# Patient Record
Sex: FEMALE | Race: WHITE | NOT HISPANIC OR LATINO | Employment: FULL TIME | URBAN - METROPOLITAN AREA
[De-identification: names, ages, dates, MRNs, and addresses within clinical notes are randomized per-mention and may not be internally consistent; named-entity substitution may affect disease eponyms.]

---

## 2017-02-04 ASSESSMENT — MIFFLIN-ST. JEOR: SCORE: 1644.14

## 2017-02-05 ASSESSMENT — MIFFLIN-ST. JEOR: SCORE: 1643.96

## 2017-02-06 ENCOUNTER — SURGERY - HEALTHEAST (OUTPATIENT)
Dept: SURGERY | Facility: CLINIC | Age: 52
End: 2017-02-06

## 2017-02-06 ENCOUNTER — ANESTHESIA - HEALTHEAST (OUTPATIENT)
Dept: SURGERY | Facility: CLINIC | Age: 52
End: 2017-02-06

## 2017-02-06 ASSESSMENT — MIFFLIN-ST. JEOR: SCORE: 1637.16

## 2017-02-07 ASSESSMENT — MIFFLIN-ST. JEOR: SCORE: 1641.41

## 2017-02-09 ENCOUNTER — COMMUNICATION - HEALTHEAST (OUTPATIENT)
Dept: FAMILY MEDICINE | Facility: CLINIC | Age: 52
End: 2017-02-09

## 2017-02-10 ENCOUNTER — COMMUNICATION - HEALTHEAST (OUTPATIENT)
Dept: FAMILY MEDICINE | Facility: CLINIC | Age: 52
End: 2017-02-10

## 2017-02-13 ENCOUNTER — COMMUNICATION - HEALTHEAST (OUTPATIENT)
Dept: FAMILY MEDICINE | Facility: CLINIC | Age: 52
End: 2017-02-13

## 2017-04-18 ENCOUNTER — OFFICE VISIT - HEALTHEAST (OUTPATIENT)
Dept: FAMILY MEDICINE | Facility: CLINIC | Age: 52
End: 2017-04-18

## 2017-04-18 DIAGNOSIS — K85.10 GALLSTONE PANCREATITIS: ICD-10-CM

## 2017-04-18 DIAGNOSIS — F32.A DEPRESSION: ICD-10-CM

## 2017-04-18 ASSESSMENT — MIFFLIN-ST. JEOR: SCORE: 1563.39

## 2017-05-18 ENCOUNTER — COMMUNICATION - HEALTHEAST (OUTPATIENT)
Dept: FAMILY MEDICINE | Facility: CLINIC | Age: 52
End: 2017-05-18

## 2017-05-18 DIAGNOSIS — F33.9 MAJOR DEPRESSIVE DISORDER, RECURRENT EPISODE, UNSPECIFIED: ICD-10-CM

## 2017-06-25 ENCOUNTER — COMMUNICATION - HEALTHEAST (OUTPATIENT)
Dept: FAMILY MEDICINE | Facility: CLINIC | Age: 52
End: 2017-06-25

## 2017-06-25 DIAGNOSIS — E03.9 UNSPECIFIED HYPOTHYROIDISM: ICD-10-CM

## 2017-11-10 ENCOUNTER — COMMUNICATION - HEALTHEAST (OUTPATIENT)
Dept: FAMILY MEDICINE | Facility: CLINIC | Age: 52
End: 2017-11-10

## 2017-11-10 DIAGNOSIS — E03.9 HYPOTHYROIDISM: ICD-10-CM

## 2017-11-22 ENCOUNTER — COMMUNICATION - HEALTHEAST (OUTPATIENT)
Dept: FAMILY MEDICINE | Facility: CLINIC | Age: 52
End: 2017-11-22

## 2017-11-22 DIAGNOSIS — F32.A DEPRESSION: ICD-10-CM

## 2017-12-06 ENCOUNTER — OFFICE VISIT - HEALTHEAST (OUTPATIENT)
Dept: FAMILY MEDICINE | Facility: CLINIC | Age: 52
End: 2017-12-06

## 2017-12-06 DIAGNOSIS — E03.9 HYPOTHYROIDISM: ICD-10-CM

## 2017-12-06 DIAGNOSIS — F32.A DEPRESSION: ICD-10-CM

## 2017-12-06 DIAGNOSIS — L30.9 ECZEMA: ICD-10-CM

## 2017-12-06 DIAGNOSIS — Z12.31 VISIT FOR SCREENING MAMMOGRAM: ICD-10-CM

## 2017-12-06 ASSESSMENT — MIFFLIN-ST. JEOR: SCORE: 1547.07

## 2017-12-08 ENCOUNTER — COMMUNICATION - HEALTHEAST (OUTPATIENT)
Dept: FAMILY MEDICINE | Facility: CLINIC | Age: 52
End: 2017-12-08

## 2017-12-08 DIAGNOSIS — E03.9 HYPOTHYROIDISM: ICD-10-CM

## 2018-01-19 ENCOUNTER — HOSPITAL ENCOUNTER (OUTPATIENT)
Dept: MAMMOGRAPHY | Facility: CLINIC | Age: 53
Discharge: HOME | End: 2018-01-19
Attending: FAMILY MEDICINE

## 2018-01-19 DIAGNOSIS — Z12.31 VISIT FOR SCREENING MAMMOGRAM: ICD-10-CM

## 2018-04-30 ENCOUNTER — RECORDS - HEALTHEAST (OUTPATIENT)
Dept: ADMINISTRATIVE | Facility: OTHER | Age: 53
End: 2018-04-30

## 2018-05-30 ENCOUNTER — COMMUNICATION - HEALTHEAST (OUTPATIENT)
Dept: FAMILY MEDICINE | Facility: CLINIC | Age: 53
End: 2018-05-30

## 2018-05-30 DIAGNOSIS — F32.A DEPRESSION: ICD-10-CM

## 2018-06-28 ENCOUNTER — COMMUNICATION - HEALTHEAST (OUTPATIENT)
Dept: FAMILY MEDICINE | Facility: CLINIC | Age: 53
End: 2018-06-28

## 2018-06-28 DIAGNOSIS — F32.A DEPRESSION: ICD-10-CM

## 2018-07-13 ENCOUNTER — OFFICE VISIT - HEALTHEAST (OUTPATIENT)
Dept: FAMILY MEDICINE | Facility: CLINIC | Age: 53
End: 2018-07-13

## 2018-07-13 DIAGNOSIS — Z12.11 SCREEN FOR COLON CANCER: ICD-10-CM

## 2018-07-13 DIAGNOSIS — E03.9 HYPOTHYROIDISM: ICD-10-CM

## 2018-07-13 DIAGNOSIS — F32.A DEPRESSION: ICD-10-CM

## 2018-10-06 ENCOUNTER — OFFICE VISIT - HEALTHEAST (OUTPATIENT)
Dept: FAMILY MEDICINE | Facility: CLINIC | Age: 53
End: 2018-10-06

## 2018-10-06 DIAGNOSIS — R05.9 COUGH: ICD-10-CM

## 2018-10-06 DIAGNOSIS — R07.0 THROAT PAIN: ICD-10-CM

## 2018-10-06 LAB — DEPRECATED S PYO AG THROAT QL EIA: NORMAL

## 2018-10-07 LAB — GROUP A STREP BY PCR: NORMAL

## 2018-10-09 ENCOUNTER — OFFICE VISIT - HEALTHEAST (OUTPATIENT)
Dept: FAMILY MEDICINE | Facility: CLINIC | Age: 53
End: 2018-10-09

## 2018-10-09 DIAGNOSIS — J18.9 PNEUMONIA OF RIGHT LOWER LOBE DUE TO INFECTIOUS ORGANISM: ICD-10-CM

## 2018-10-09 DIAGNOSIS — J18.1 LOBAR PNEUMONIA, UNSPECIFIED ORGANISM (H): ICD-10-CM

## 2018-10-12 ENCOUNTER — OFFICE VISIT - HEALTHEAST (OUTPATIENT)
Dept: FAMILY MEDICINE | Facility: CLINIC | Age: 53
End: 2018-10-12

## 2018-10-12 DIAGNOSIS — J18.9 PNEUMONIA: ICD-10-CM

## 2018-10-12 ASSESSMENT — MIFFLIN-ST. JEOR: SCORE: 1614.2

## 2018-10-22 ENCOUNTER — COMMUNICATION - HEALTHEAST (OUTPATIENT)
Dept: SCHEDULING | Facility: CLINIC | Age: 53
End: 2018-10-22

## 2018-10-22 ENCOUNTER — COMMUNICATION - HEALTHEAST (OUTPATIENT)
Dept: FAMILY MEDICINE | Facility: CLINIC | Age: 53
End: 2018-10-22

## 2018-10-30 ENCOUNTER — OFFICE VISIT - HEALTHEAST (OUTPATIENT)
Dept: FAMILY MEDICINE | Facility: CLINIC | Age: 53
End: 2018-10-30

## 2018-10-30 DIAGNOSIS — J02.9 SORE THROAT: ICD-10-CM

## 2018-10-30 DIAGNOSIS — J18.9 PNEUMONIA: ICD-10-CM

## 2018-10-31 ENCOUNTER — OFFICE VISIT - HEALTHEAST (OUTPATIENT)
Dept: OTOLARYNGOLOGY | Facility: CLINIC | Age: 53
End: 2018-10-31

## 2018-10-31 DIAGNOSIS — R07.0 THROAT PAIN: ICD-10-CM

## 2018-11-01 ENCOUNTER — OFFICE VISIT - HEALTHEAST (OUTPATIENT)
Dept: AUDIOLOGY | Facility: CLINIC | Age: 53
End: 2018-11-01

## 2018-11-01 DIAGNOSIS — Z71.1 PERSON WITH FEARED COMPLAINT IN WHOM NO DIAGNOSIS WAS MADE: ICD-10-CM

## 2018-11-20 ENCOUNTER — COMMUNICATION - HEALTHEAST (OUTPATIENT)
Dept: FAMILY MEDICINE | Facility: CLINIC | Age: 53
End: 2018-11-20

## 2018-11-20 DIAGNOSIS — R05.9 COUGH: ICD-10-CM

## 2018-11-22 ENCOUNTER — COMMUNICATION - HEALTHEAST (OUTPATIENT)
Dept: FAMILY MEDICINE | Facility: CLINIC | Age: 53
End: 2018-11-22

## 2018-11-22 DIAGNOSIS — R05.9 COUGH: ICD-10-CM

## 2018-11-23 ENCOUNTER — COMMUNICATION - HEALTHEAST (OUTPATIENT)
Dept: FAMILY MEDICINE | Facility: CLINIC | Age: 53
End: 2018-11-23

## 2018-11-23 DIAGNOSIS — R05.9 COUGH: ICD-10-CM

## 2018-11-28 ENCOUNTER — COMMUNICATION - HEALTHEAST (OUTPATIENT)
Dept: FAMILY MEDICINE | Facility: CLINIC | Age: 53
End: 2018-11-28

## 2018-11-28 DIAGNOSIS — E03.9 HYPOTHYROIDISM: ICD-10-CM

## 2019-01-14 ENCOUNTER — OFFICE VISIT - HEALTHEAST (OUTPATIENT)
Dept: FAMILY MEDICINE | Facility: CLINIC | Age: 54
End: 2019-01-14

## 2019-01-14 DIAGNOSIS — E03.9 HYPOTHYROIDISM, UNSPECIFIED TYPE: ICD-10-CM

## 2019-01-14 DIAGNOSIS — F32.9 MAJOR DEPRESSIVE DISORDER WITH SINGLE EPISODE, REMISSION STATUS UNSPECIFIED: ICD-10-CM

## 2019-01-14 DIAGNOSIS — Z23 NEED FOR TDAP VACCINATION: ICD-10-CM

## 2019-01-15 LAB
T4 FREE SERPL-MCNC: 1 NG/DL (ref 0.7–1.8)
TSH SERPL DL<=0.005 MIU/L-ACNC: 1.49 UIU/ML (ref 0.3–5)

## 2019-05-18 ENCOUNTER — COMMUNICATION - HEALTHEAST (OUTPATIENT)
Dept: FAMILY MEDICINE | Facility: CLINIC | Age: 54
End: 2019-05-18

## 2019-05-18 DIAGNOSIS — E03.9 HYPOTHYROIDISM: ICD-10-CM

## 2019-07-11 ENCOUNTER — COMMUNICATION - HEALTHEAST (OUTPATIENT)
Dept: FAMILY MEDICINE | Facility: CLINIC | Age: 54
End: 2019-07-11

## 2019-07-11 DIAGNOSIS — F32.9 MAJOR DEPRESSIVE DISORDER WITH SINGLE EPISODE, REMISSION STATUS UNSPECIFIED: ICD-10-CM

## 2019-07-16 ENCOUNTER — OFFICE VISIT - HEALTHEAST (OUTPATIENT)
Dept: FAMILY MEDICINE | Facility: CLINIC | Age: 54
End: 2019-07-16

## 2019-07-16 DIAGNOSIS — E03.9 HYPOTHYROIDISM, UNSPECIFIED TYPE: ICD-10-CM

## 2019-07-16 DIAGNOSIS — F33.41 RECURRENT MAJOR DEPRESSIVE DISORDER, IN PARTIAL REMISSION (H): ICD-10-CM

## 2019-07-16 DIAGNOSIS — L98.9 SKIN LESION: ICD-10-CM

## 2019-07-16 DIAGNOSIS — Z12.31 VISIT FOR SCREENING MAMMOGRAM: ICD-10-CM

## 2019-07-17 LAB
T4 FREE SERPL-MCNC: 0.9 NG/DL (ref 0.7–1.8)
TSH SERPL DL<=0.005 MIU/L-ACNC: 1.62 UIU/ML (ref 0.3–5)

## 2019-09-06 ENCOUNTER — HOSPITAL ENCOUNTER (OUTPATIENT)
Dept: MAMMOGRAPHY | Facility: CLINIC | Age: 54
Discharge: HOME | End: 2019-09-06
Attending: FAMILY MEDICINE

## 2019-09-06 DIAGNOSIS — Z12.31 VISIT FOR SCREENING MAMMOGRAM: ICD-10-CM

## 2020-01-17 ENCOUNTER — OFFICE VISIT - HEALTHEAST (OUTPATIENT)
Dept: FAMILY MEDICINE | Facility: CLINIC | Age: 55
End: 2020-01-17

## 2020-01-17 DIAGNOSIS — R11.10 VOMITING AND DIARRHEA: ICD-10-CM

## 2020-01-17 DIAGNOSIS — E03.9 HYPOTHYROIDISM, UNSPECIFIED TYPE: ICD-10-CM

## 2020-01-17 DIAGNOSIS — Z00.00 ROUTINE GENERAL MEDICAL EXAMINATION AT A HEALTH CARE FACILITY: ICD-10-CM

## 2020-01-17 DIAGNOSIS — F33.41 RECURRENT MAJOR DEPRESSIVE DISORDER, IN PARTIAL REMISSION (H): ICD-10-CM

## 2020-01-17 DIAGNOSIS — R19.7 VOMITING AND DIARRHEA: ICD-10-CM

## 2020-01-17 DIAGNOSIS — K59.00 CONSTIPATION, UNSPECIFIED CONSTIPATION TYPE: ICD-10-CM

## 2020-01-17 LAB
ALBUMIN UR-MCNC: NEGATIVE MG/DL
APPEARANCE UR: CLEAR
BILIRUB UR QL STRIP: NEGATIVE
CHOLEST SERPL-MCNC: 158 MG/DL
COLOR UR AUTO: YELLOW
FASTING STATUS PATIENT QL REPORTED: YES
GLUCOSE UR STRIP-MCNC: NEGATIVE MG/DL
HDLC SERPL-MCNC: 57 MG/DL
HGB BLD-MCNC: 14.1 G/DL (ref 12–16)
HGB UR QL STRIP: NEGATIVE
KETONES UR STRIP-MCNC: NEGATIVE MG/DL
LDLC SERPL CALC-MCNC: 82 MG/DL
LEUKOCYTE ESTERASE UR QL STRIP: NEGATIVE
NITRATE UR QL: NEGATIVE
PH UR STRIP: 7 [PH] (ref 5–8)
SP GR UR STRIP: 1.02 (ref 1–1.03)
T4 FREE SERPL-MCNC: 0.9 NG/DL (ref 0.7–1.8)
TRIGL SERPL-MCNC: 94 MG/DL
TSH SERPL DL<=0.005 MIU/L-ACNC: 1.61 UIU/ML (ref 0.3–5)
UROBILINOGEN UR STRIP-ACNC: NORMAL

## 2020-01-17 ASSESSMENT — ANXIETY QUESTIONNAIRES
2. NOT BEING ABLE TO STOP OR CONTROL WORRYING: NEARLY EVERY DAY
1. FEELING NERVOUS, ANXIOUS, OR ON EDGE: SEVERAL DAYS
GAD7 TOTAL SCORE: 12
IF YOU CHECKED OFF ANY PROBLEMS ON THIS QUESTIONNAIRE, HOW DIFFICULT HAVE THESE PROBLEMS MADE IT FOR YOU TO DO YOUR WORK, TAKE CARE OF THINGS AT HOME, OR GET ALONG WITH OTHER PEOPLE: SOMEWHAT DIFFICULT
6. BECOMING EASILY ANNOYED OR IRRITABLE: MORE THAN HALF THE DAYS
7. FEELING AFRAID AS IF SOMETHING AWFUL MIGHT HAPPEN: MORE THAN HALF THE DAYS
5. BEING SO RESTLESS THAT IT IS HARD TO SIT STILL: SEVERAL DAYS
4. TROUBLE RELAXING: SEVERAL DAYS
3. WORRYING TOO MUCH ABOUT DIFFERENT THINGS: MORE THAN HALF THE DAYS

## 2020-01-17 ASSESSMENT — PATIENT HEALTH QUESTIONNAIRE - PHQ9: SUM OF ALL RESPONSES TO PHQ QUESTIONS 1-9: 6

## 2020-01-17 ASSESSMENT — MIFFLIN-ST. JEOR: SCORE: 1658.43

## 2020-01-20 LAB
HPV SOURCE: NORMAL
HUMAN PAPILLOMA VIRUS 16 DNA: NEGATIVE
HUMAN PAPILLOMA VIRUS 18 DNA: NEGATIVE
HUMAN PAPILLOMA VIRUS FINAL DIAGNOSIS: NORMAL
HUMAN PAPILLOMA VIRUS OTHER HR: NEGATIVE
SPECIMEN DESCRIPTION: NORMAL

## 2020-03-13 ENCOUNTER — COMMUNICATION - HEALTHEAST (OUTPATIENT)
Dept: FAMILY MEDICINE | Facility: CLINIC | Age: 55
End: 2020-03-13

## 2020-03-13 DIAGNOSIS — F33.41 RECURRENT MAJOR DEPRESSIVE DISORDER, IN PARTIAL REMISSION (H): ICD-10-CM

## 2020-03-20 ENCOUNTER — COMMUNICATION - HEALTHEAST (OUTPATIENT)
Dept: FAMILY MEDICINE | Facility: CLINIC | Age: 55
End: 2020-03-20

## 2020-03-20 DIAGNOSIS — F33.41 RECURRENT MAJOR DEPRESSIVE DISORDER, IN PARTIAL REMISSION (H): ICD-10-CM

## 2020-03-23 ENCOUNTER — COMMUNICATION - HEALTHEAST (OUTPATIENT)
Dept: FAMILY MEDICINE | Facility: CLINIC | Age: 55
End: 2020-03-23

## 2020-04-23 ENCOUNTER — COMMUNICATION - HEALTHEAST (OUTPATIENT)
Dept: FAMILY MEDICINE | Facility: CLINIC | Age: 55
End: 2020-04-23

## 2020-04-23 DIAGNOSIS — F33.41 RECURRENT MAJOR DEPRESSIVE DISORDER, IN PARTIAL REMISSION (H): ICD-10-CM

## 2020-06-16 ENCOUNTER — OFFICE VISIT - HEALTHEAST (OUTPATIENT)
Dept: FAMILY MEDICINE | Facility: CLINIC | Age: 55
End: 2020-06-16

## 2020-06-16 DIAGNOSIS — E03.9 HYPOTHYROIDISM, UNSPECIFIED TYPE: ICD-10-CM

## 2020-06-16 DIAGNOSIS — F33.41 RECURRENT MAJOR DEPRESSIVE DISORDER, IN PARTIAL REMISSION (H): ICD-10-CM

## 2020-06-16 ASSESSMENT — ANXIETY QUESTIONNAIRES
4. TROUBLE RELAXING: NOT AT ALL
6. BECOMING EASILY ANNOYED OR IRRITABLE: MORE THAN HALF THE DAYS
1. FEELING NERVOUS, ANXIOUS, OR ON EDGE: MORE THAN HALF THE DAYS
IF YOU CHECKED OFF ANY PROBLEMS ON THIS QUESTIONNAIRE, HOW DIFFICULT HAVE THESE PROBLEMS MADE IT FOR YOU TO DO YOUR WORK, TAKE CARE OF THINGS AT HOME, OR GET ALONG WITH OTHER PEOPLE: NOT DIFFICULT AT ALL
2. NOT BEING ABLE TO STOP OR CONTROL WORRYING: SEVERAL DAYS
3. WORRYING TOO MUCH ABOUT DIFFERENT THINGS: NEARLY EVERY DAY
7. FEELING AFRAID AS IF SOMETHING AWFUL MIGHT HAPPEN: MORE THAN HALF THE DAYS
GAD7 TOTAL SCORE: 10
5. BEING SO RESTLESS THAT IT IS HARD TO SIT STILL: NOT AT ALL

## 2020-06-16 ASSESSMENT — PATIENT HEALTH QUESTIONNAIRE - PHQ9: SUM OF ALL RESPONSES TO PHQ QUESTIONS 1-9: 8

## 2020-09-24 ENCOUNTER — COMMUNICATION - HEALTHEAST (OUTPATIENT)
Dept: FAMILY MEDICINE | Facility: CLINIC | Age: 55
End: 2020-09-24

## 2020-09-24 DIAGNOSIS — E03.9 HYPOTHYROIDISM, UNSPECIFIED TYPE: ICD-10-CM

## 2021-01-04 ENCOUNTER — COMMUNICATION - HEALTHEAST (OUTPATIENT)
Dept: FAMILY MEDICINE | Facility: CLINIC | Age: 56
End: 2021-01-04

## 2021-01-04 DIAGNOSIS — E03.9 HYPOTHYROIDISM, UNSPECIFIED TYPE: ICD-10-CM

## 2021-01-04 DIAGNOSIS — F33.41 RECURRENT MAJOR DEPRESSIVE DISORDER, IN PARTIAL REMISSION (H): ICD-10-CM

## 2021-02-02 ENCOUNTER — OFFICE VISIT - HEALTHEAST (OUTPATIENT)
Dept: FAMILY MEDICINE | Facility: CLINIC | Age: 56
End: 2021-02-02

## 2021-02-02 DIAGNOSIS — F33.41 RECURRENT MAJOR DEPRESSIVE DISORDER, IN PARTIAL REMISSION (H): ICD-10-CM

## 2021-02-02 DIAGNOSIS — Z12.11 SCREEN FOR COLON CANCER: ICD-10-CM

## 2021-02-02 DIAGNOSIS — E03.9 HYPOTHYROIDISM, UNSPECIFIED TYPE: ICD-10-CM

## 2021-02-02 ASSESSMENT — ANXIETY QUESTIONNAIRES
IF YOU CHECKED OFF ANY PROBLEMS ON THIS QUESTIONNAIRE, HOW DIFFICULT HAVE THESE PROBLEMS MADE IT FOR YOU TO DO YOUR WORK, TAKE CARE OF THINGS AT HOME, OR GET ALONG WITH OTHER PEOPLE: NOT DIFFICULT AT ALL
5. BEING SO RESTLESS THAT IT IS HARD TO SIT STILL: NOT AT ALL
2. NOT BEING ABLE TO STOP OR CONTROL WORRYING: SEVERAL DAYS
4. TROUBLE RELAXING: NOT AT ALL
3. WORRYING TOO MUCH ABOUT DIFFERENT THINGS: MORE THAN HALF THE DAYS
5. BEING SO RESTLESS THAT IT IS HARD TO SIT STILL: NOT AT ALL
1. FEELING NERVOUS, ANXIOUS, OR ON EDGE: NOT AT ALL
2. NOT BEING ABLE TO STOP OR CONTROL WORRYING: SEVERAL DAYS
1. FEELING NERVOUS, ANXIOUS, OR ON EDGE: NEARLY EVERY DAY
IF YOU CHECKED OFF ANY PROBLEMS ON THIS QUESTIONNAIRE, HOW DIFFICULT HAVE THESE PROBLEMS MADE IT FOR YOU TO DO YOUR WORK, TAKE CARE OF THINGS AT HOME, OR GET ALONG WITH OTHER PEOPLE: NOT DIFFICULT AT ALL
4. TROUBLE RELAXING: MORE THAN HALF THE DAYS
GAD7 TOTAL SCORE: 9
3. WORRYING TOO MUCH ABOUT DIFFERENT THINGS: SEVERAL DAYS
7. FEELING AFRAID AS IF SOMETHING AWFUL MIGHT HAPPEN: SEVERAL DAYS
7. FEELING AFRAID AS IF SOMETHING AWFUL MIGHT HAPPEN: MORE THAN HALF THE DAYS
6. BECOMING EASILY ANNOYED OR IRRITABLE: SEVERAL DAYS
GAD7 TOTAL SCORE: 5
6. BECOMING EASILY ANNOYED OR IRRITABLE: NOT AT ALL

## 2021-02-02 ASSESSMENT — PATIENT HEALTH QUESTIONNAIRE - PHQ9: SUM OF ALL RESPONSES TO PHQ QUESTIONS 1-9: 7

## 2021-02-04 ENCOUNTER — AMBULATORY - HEALTHEAST (OUTPATIENT)
Dept: LAB | Facility: CLINIC | Age: 56
End: 2021-02-04

## 2021-02-04 DIAGNOSIS — E03.9 HYPOTHYROIDISM, UNSPECIFIED TYPE: ICD-10-CM

## 2021-02-04 LAB
T4 FREE SERPL-MCNC: 0.9 NG/DL (ref 0.7–1.8)
TSH SERPL DL<=0.005 MIU/L-ACNC: 2.59 UIU/ML (ref 0.3–5)

## 2021-05-27 ASSESSMENT — PATIENT HEALTH QUESTIONNAIRE - PHQ9
SUM OF ALL RESPONSES TO PHQ QUESTIONS 1-9: 8
SUM OF ALL RESPONSES TO PHQ QUESTIONS 1-9: 7
SUM OF ALL RESPONSES TO PHQ QUESTIONS 1-9: 6

## 2021-05-28 ASSESSMENT — ANXIETY QUESTIONNAIRES
GAD7 TOTAL SCORE: 12
GAD7 TOTAL SCORE: 10
GAD7 TOTAL SCORE: 5

## 2021-05-28 NOTE — TELEPHONE ENCOUNTER
Refill Approved    Rx renewed per Medication Renewal Policy. Medication was last renewed on 11/30/18  #90 R-0.    Last OV 1/14/19    Sonya Rodriguez, [facility] Triage/Med Refill 5/19/2019     Requested Prescriptions   Pending Prescriptions Disp Refills     levothyroxine (SYNTHROID, LEVOTHROID) 75 MCG tablet [Pharmacy Med Name: LEVOTHYROXINE 0.075MG (75MCG) TABS] 90 tablet 0     Sig: TAKE 1 TABLET BY MOUTH EVERY DAY AT 6AM       Thyroid Hormones Protocol Passed - 5/18/2019  3:13 AM        Passed - Provider visit in past 12 months or next 3 months     Last office visit with prescriber/PCP: 1/14/2019 Beronica Serrato MD OR same dept: 1/14/2019 Beronica Serrato MD OR same specialty: 1/14/2019 Beronica Serrato MD  Last physical: Visit date not found Last MTM visit: Visit date not found   Next visit within 3 mo: Visit date not found  Next physical within 3 mo: Visit date not found  Prescriber OR PCP: Beronica Serrato MD  Last diagnosis associated with med order: 1. Hypothyroidism  - levothyroxine (SYNTHROID, LEVOTHROID) 75 MCG tablet [Pharmacy Med Name: LEVOTHYROXINE 0.075MG (75MCG) TABS]; TAKE 1 TABLET BY MOUTH EVERY DAY AT 6AM  Dispense: 90 tablet; Refill: 0    If protocol passes may refill for 12 months if within 3 months of last provider visit (or a total of 15 months).             Passed - TSH on file in past 12 months for patient age 12 & older     TSH   Date Value Ref Range Status   01/14/2019 1.49 0.30 - 5.00 uIU/mL Final

## 2021-05-30 ENCOUNTER — HEALTH MAINTENANCE LETTER (OUTPATIENT)
Age: 56
End: 2021-05-30

## 2021-05-30 VITALS — BODY MASS INDEX: 33.34 KG/M2 | WEIGHT: 220 LBS | HEIGHT: 68 IN

## 2021-05-30 VITALS — WEIGHT: 202.8 LBS | HEIGHT: 68 IN | BODY MASS INDEX: 30.74 KG/M2

## 2021-05-30 NOTE — TELEPHONE ENCOUNTER
RN cannot approve Refill Request    RN can NOT refill this medication Protocol failed and NO refill given.       Bisi Amador, [facility] Triage/Med Refill 7/11/2019    Requested Prescriptions   Pending Prescriptions Disp Refills     venlafaxine (EFFEXOR-XR) 150 MG 24 hr capsule [Pharmacy Med Name: VENLAFAXINE ER 150MG CAP] 30 capsule 0     Sig: TAKE 1 CAPSULE BY MOUTH ONCE DAILY       Venlafaxine/Desvenlafaxine Refill Protocol Failed - 7/11/2019  7:48 PM        Failed - LFT or AST or ALT in last year     Albumin   Date Value Ref Range Status   04/18/2017 3.7 3.5 - 5.0 g/dL Final     Bilirubin, Total   Date Value Ref Range Status   04/18/2017 0.4 0.0 - 1.0 mg/dL Final     Bilirubin, Direct   Date Value Ref Range Status   02/07/2017 0.2 <=0.5 mg/dL Final     Alkaline Phosphatase   Date Value Ref Range Status   04/18/2017 64 45 - 120 U/L Final     AST   Date Value Ref Range Status   04/18/2017 31 0 - 40 U/L Final     ALT   Date Value Ref Range Status   04/18/2017 38 0 - 45 U/L Final     Protein, Total   Date Value Ref Range Status   04/18/2017 6.9 6.0 - 8.0 g/dL Final                Failed - Fasting lipid cascade in last year     Cholesterol   Date Value Ref Range Status   02/06/2017 91 <=199 mg/dL Final     Triglycerides   Date Value Ref Range Status   02/06/2017 42 <=149 mg/dL Final     HDL Cholesterol   Date Value Ref Range Status   02/06/2017 42 (L) >=50 mg/dL Final     LDL Calculated   Date Value Ref Range Status   02/06/2017 41 <=129 mg/dL Final             Passed - PCP or prescribing provider visit in last year     Last office visit with prescriber/PCP: 1/14/2019 Beronica Serrato MD OR same dept: 1/14/2019 Beronica Serrato MD OR same specialty: 1/14/2019 Beronica Serrato MD  Last physical: Visit date not found Last MTM visit: Visit date not found   Next visit within 3 mo: Visit date not found  Next physical within 3 mo: Visit date not found  Prescriber OR PCP: Beronica Serrato MD  Last  diagnosis associated with med order: 1. Major depressive disorder with single episode, remission status unspecified  - venlafaxine (EFFEXOR-XR) 150 MG 24 hr capsule [Pharmacy Med Name: VENLAFAXINE ER 150MG CAP]; TAKE 1 CAPSULE BY MOUTH ONCE DAILY  Dispense: 30 capsule; Refill: 0    If protocol passes may refill for 12 months if within 3 months of last provider visit (or a total of 15 months).             Passed - Blood Pressure in last year     BP Readings from Last 1 Encounters:   01/14/19 99/64

## 2021-05-30 NOTE — PROGRESS NOTES
PROGRESS NOTE   7/16/2019    SUBJECTIVE:  Bonny Brooks is a 53 y.o. female  who presents for   Chief Complaint   Patient presents with     Medication Management     Med check     Depression     Depression has gotten better, got new job     Patient comes in today for med check as well as a couple of other issues.  She has a history of depression.  She currently is using Effexor 150 mg a day for her depression.  She feels like overall things are doing much better.  She got a new job and is created a new life for her.  She feels much much better more hopeful and really now admits that she did not realize how much her job was bringing her down.  She is not suicidal or homicidal.  Please see PHQ 9 and MIKO which are both completed in their entirety today.  She started her new job just a few days ago but is so impressed by the flexibility that they have given her and the fact that they told her it was okay for her to leave early today to come to the doctor appointment that had already been set.  She is already realizing that they are giving her a lot more opportunities than her previous job did and she is very thankful for that.  The more time that goes by the more she realizes truly that her job was probably a big part of her depression.  She also has a history of hypothyroidism and needs her thyroid levels rechecked.  She has no symptoms that would be an indication that her thyroid levels are off however we will recheck them because it has been 6 months since we have rechecked in the last time.  She was wondering about her health maintenance issues and we went over the fact that she last had a mammogram in January 2018.  She is therefore due for mammogram.  We will order a mammogram today but also will give her the phone number to call herself to schedule that.  She is due for colonoscopy in 2021.  Her other concerns are that she has a lump on the top of the crown of her head that is been there for about the last 2  weeks or so.  She calls an annoying.  It is not really necessarily painful but it is annoying for her.  Whenever she runs her fingers through her hair she notices that its there.  It does not seem to be growing but it also does not seem like is getting any smaller.  She also has a lesion in her left groin area that is been there for about the last 3 to 4 weeks or so.  Again this is annoying but not painful.  It seems like this is continuing to be the same size rather than growing.  She has not been running any fevers or showing any other signs of infection.    Patient Active Problem List   Diagnosis     Hypothyroidism, unspecified type     Recurrent major depressive disorder, in partial remission (H)       Current Outpatient Medications   Medication Sig Dispense Refill     ibuprofen (ADVIL,MOTRIN) 200 MG tablet Take 200 mg by mouth every 6 (six) hours as needed for pain.       L.ACID/L.CASEI/B.BIF/B.MARKIE/FOS (PROBIOTIC BLEND ORAL) Take 1 tablet by mouth daily.       levothyroxine (SYNTHROID, LEVOTHROID) 75 MCG tablet TAKE ONE TABLET BY MOUTH EVERY DAY AT 6 AM 90 tablet 1     venlafaxine (EFFEXOR-XR) 150 MG 24 hr capsule Take 1 capsule (150 mg total) by mouth daily. 90 capsule 1     phenylephrine/DM/acetaminop/GG (TYLENOL COLD HEAD CONGESTION ORAL) Take by mouth.       No current facility-administered medications for this visit.        Allergies   Allergen Reactions     Penicillins Hives       Past Medical History:   Diagnosis Date     Alcohol abuse     sober now     Gallstone pancreatitis 2017    had gallbladder removed     Genital herpes      Major depressive disorder, recurrent episode, unspecified      Normal delivery     x2     Unspecified hypothyroidism      Unspecified spontaneous  without mention of complication     x2       Past Surgical History:   Procedure Laterality Date     LAPAROSCOPIC CHOLECYSTECTOMY N/A 2017    Procedure: CHOLECYSTECTOMY, LAPAROSCOPIC;  Surgeon: Sandeep Ariza MD;   Location: [facility] OR;  Service:      REDUCTION MAMMAPLASTY  2006       Social History     Tobacco Use   Smoking Status Never Smoker   Smokeless Tobacco Never Used       OBJECTIVE:     /80   Pulse 89   Wt (!) 224 lb (101.6 kg)   SpO2 98%   BMI 34.06 kg/m      Physical Exam:  GENERAL APPEARANCE: A&A, NAD, well hydrated, well nourished  SKIN:  Normal skin turgor  On the top of the head near the crown of the head she has a very small flesh-colored lump that I cannot visualize but can feel when she directs me as to where the lump is.  There is no redness there is no warmth to the touch there is no other evidence for infection.  NECK: Supple, full ROM, no significant lymphadenopathy or thyromegaly  CV: RRR, no M/G/R   LUNGS: CTAB  On exam of her left groin area basically outside of the labia majora area there is a 3 mm size raised somewhat hard purple bump that appears to be an ingrown hair.  There is no redness there is no warmth to the touch there is no fluctuance around the area.  There is no evidence for infection at all.  There is a dark spot in the middle which appears to be a ingrown hair.  The entire area is nontender to palpation.  EXTREMITY: no swelling noted.  Full range of motion of all 4 extremities.   NEURO: no gross deficits   PSYCHIATRIC;  Mood appropriate, memory intact  A&O x3, thought processes congruent, non-tangential. No hallucinations/delusions. Insight/judgment: intact. Denies suicidal/homicidal ideations.      LABS:     Recent Results (from the past 240 hour(s))   Thyroid Stimulating Hormone (TSH)   Result Value Ref Range    TSH 1.62 0.30 - 5.00 uIU/mL   T4, Free   Result Value Ref Range    Free T4 0.9 0.7 - 1.8 ng/dL       ASSESSMENT/PLAN:     Recurrent major depressive disorder, in partial remission (H) [F33.41]      1. Recurrent major depressive disorder, in partial remission (H)  - venlafaxine (EFFEXOR-XR) 150 MG 24 hr capsule; Take 1 capsule (150 mg total) by mouth daily.   Dispense: 90 capsule; Refill: 1    2. Hypothyroidism, unspecified type  - Thyroid Stimulating Hormone (TSH)  - T4, Free  - levothyroxine (SYNTHROID, LEVOTHROID) 75 MCG tablet; TAKE ONE TABLET BY MOUTH EVERY DAY AT 6 AM  Dispense: 90 tablet; Refill: 1    3. Skin lesion    4. Visit for screening mammogram  - Mammo Screening Bilateral; Future    Patient overall seems to be doing okay.  In terms of her depression she continues on Effexor 150 mg a day.  She feels like overall things are doing much better than they were and she credits that mostly the fact that she got a new job.  She is tolerating the Effexor without any problems and would very much like to continue on this medication.  She is not suicidal or homicidal.  Please see PHQ 9 and MIKO which are both completed in their entirety today.  Refill of her Effexor was given today.  3-month supply was given with a refill which should take her through the next 6 months.  She has any changes in her symptoms or if things become worse she certainly should let us know.  It sounds like getting a new job is really given her new perspective on life and she is improved in terms of her depression and I am hoping that we will do nothing but improve again.  She does have a history of hypothyroidism we will go ahead and check a thyroid level today including a TSH as well as a free T4.  She does need a refill of her thyroid medication today.  I did send a refill today to the pharmacy but asked her not to pick it up until after the thyroid levels have returned.  She overall is feeling well and does not have any indication that her thyroid levels are off.  In terms of the skin lesions that she has I could not see anything on the top of her head to explain the lump that she is feeling or the annoying pain that she is feeling.  I looked very hard to find any kind of a lump and the only thing I can feel is a very slight bump on the very crown of her head which is nontender to palpation  not red and is not visualized at all.  She will continue to monitor this is reassurance was given today that I do not see anything that makes me worry.  She has additional changes in her symptoms or has more problems will certainly let me know.  In terms of the lesion in her left groin area I think this is an ingrown hair.  It is nontender to palpation it is not red is not fluctuant and showing no sign of infection and I reassured her that I do not see any evidence for abnormality right now.  It is not bothering her tremendously and therefore I would suggest that she continue to monitor it for now.  She is fine with doing that for now and was happy to know that it was not anything else that she needed to be concerned about.  Again if this changes will certainly let me know.  Mammogram was ordered today.  They should contact her to get mammogram scheduled she was also given the phone number to call to schedule a mammogram.  All of her questions were answered today.  We will see her back in about 6 months for follow-up on these medical problems as well as her annual exam. Total time spent with patient was at least 25 minutes,  of which greater than fifty percent was spent in counselling and coordination of care of the above medical problems.   Beronica Serrato MD

## 2021-05-30 NOTE — TELEPHONE ENCOUNTER
Prescription sent to pharmacy electronically. Please call and let patient know this should be available for  at the pharmacy.    Patient has appointment for follow up in the next month already set up.

## 2021-05-31 VITALS — WEIGHT: 199.2 LBS | HEIGHT: 68 IN | BODY MASS INDEX: 30.19 KG/M2

## 2021-06-01 VITALS — BODY MASS INDEX: 32.69 KG/M2 | WEIGHT: 215 LBS

## 2021-06-02 VITALS — BODY MASS INDEX: 32.23 KG/M2 | WEIGHT: 212 LBS

## 2021-06-02 VITALS — WEIGHT: 214.31 LBS | BODY MASS INDEX: 32.59 KG/M2

## 2021-06-02 VITALS — HEIGHT: 68 IN | BODY MASS INDEX: 32.43 KG/M2 | WEIGHT: 214 LBS

## 2021-06-02 VITALS — BODY MASS INDEX: 32.39 KG/M2 | WEIGHT: 213 LBS

## 2021-06-02 VITALS — WEIGHT: 216 LBS | BODY MASS INDEX: 32.84 KG/M2

## 2021-06-03 VITALS — BODY MASS INDEX: 34.06 KG/M2 | WEIGHT: 224 LBS

## 2021-06-04 VITALS
HEIGHT: 69 IN | OXYGEN SATURATION: 95 % | SYSTOLIC BLOOD PRESSURE: 102 MMHG | BODY MASS INDEX: 32.88 KG/M2 | HEART RATE: 80 BPM | WEIGHT: 222 LBS | DIASTOLIC BLOOD PRESSURE: 72 MMHG

## 2021-06-05 NOTE — PROGRESS NOTES
Assessment:      Healthy female exam.      Plan:       All questions answered.  Await pap smear results.      ASSESSMENT: 54 y.o. female physical exam and pap smear.    PLAN:     Routine general medical examination at a health care facility [Z00.00]    1. Routine general medical examination at a health care facility  - Gynecologic Cytology (PAP Smear)  - Hemoglobin  - Urinalysis Macroscopic  - Lipid Cascade  - HPV High Risk DNA Cervical    2. Hypothyroidism, unspecified type  - Thyroid Stimulating Hormone (TSH)  - T4, Free  - levothyroxine (SYNTHROID, LEVOTHROID) 75 MCG tablet; TAKE ONE TABLET BY MOUTH EVERY DAY AT 6 AM  Dispense: 90 tablet; Refill: 3    3. Recurrent major depressive disorder, in partial remission (H)    4. Vomiting and diarrhea  - Ambulatory referral to Gastroenterology    5. Constipation, unspecified constipation type  - Ambulatory referral to Gastroenterology      Patient is a 54 y.o. female who is overall doing well.  She continues on Effexor which is working well for depression.  She does not need a refill of that today but when she does she certainly will let me know.  She is not suicidal or homicidal.  Please see PHQ 9 and MIKO which are both complete in their entirety today.  We will continue to refill her Effexor for another 6 months and then she is due to follow-up at that time.  She also has a history of hypothyroidism we will recheck thyroid levels today.  She is not having any symptoms that be suggestive of her thyroid level being off.  I did refill her thyroid medication today.  She will wait until she hears back on the results of the thyroid testing that was done today before she goes to pick it up but I suspect she will continue on the same dose.  She has had a lot of problems with vomiting diarrhea and constipation.  She has had problems with vomiting and pasty stools since the last pregnancy about 15 years ago whenever she is due for her cycle.  It comes about a week before her  cycle but since July now she has had more constipation than diarrhea that has been accompanied by vomiting.  She did the vomiting is such that she has an episode of vomiting and then she is Apsley fine afterwards.  She has no blood in her stool at all either.  It definitely seems to be related to her hormones and when she is due for her menstrual cycle.  She is starting to get a little bit more irregularity to her menstrual cycles and still gets the vomiting when she should get her cycle even if she does not get her cycle.  I think she would be best to be evaluated further.  I did place referral to gastroenterologist and someone from their office should contact her regarding getting an appointment set up.  She declines flu vaccination today.  She declined shingles vaccination today.  She declines HIV testing and hepatitis C testing.  She notes that she is due for mammogram in September 2020.  She had her last colonoscopy in 2011.  All of her questions and concerns were addressed today.  If she has additional problems or concerns should let me know.    Will contact her with the results of the labs when available.  Additional 15 minutes spent with patient discussing nausea and vomiting symptoms as well as constipation and especially in terms of how it relates to her cycles as well as her diagnosis of major depression and Effexor usage..  Beronica Serrato M.D.       Subjective:      Bonny Brooks is a 54 y.o. female who presents for an annual exam. The patient is sexually active. The patient participates in regular exercise: no. The patient reports that there is not domestic violence in her life.  She does have a history of hypothyroidism and does need her thyroid levels drawn today.  We will check a TSH as well as a free T4 to follow-up on her hypothyroidism.  She continues on Synthroid which seems to be working well.  We will give her a refill of that today.  She also has a history of depression and is doing  well on her Effexor.  Please see PHQ 9 and MIKO which are both complete in their entirety today.  She is not suicidal or homicidal.  She has been on Effexor for quite some time and tolerates it well.  She had a disturbing discussion with her  this morning just prior to coming so she is feeling like a little bit of a failure currently but normally feels fine.  She does not need a refill the medication today when she does she certainly will let me know.  She is concerned about bowel movements.  She notes that she is having very inconsistent bowel movements.  Especially 1 week prior to her cycle she will have vomiting and pasty stools.  This is been the case for the last 15 years since she gave birth to her last child.  In July of this past year she noted that she had the vomiting in the pasty stools but she also had lots of issues with constipation as well.  This seems to have persisted and since then she has had more constipation with vomiting rather than diarrhea with vomiting.  She has had some irregularity to her cycles as well and she is wondering if perhaps that is what is contributing to the difference in her bowel habits.  Unfortunately she is feeling really ill at the times when she should have her menstrual cycle even if she does not get her menstrual cycle.  She notes that she vomits and then an hour later she is absolutely fine so she is fairly certain that this is not an infection but rather something to do with her hormone levels.  She had a normal colonoscopy in 2011.  She has not noticed any blood in her stools at all.  Patient is due for shingles vaccination as well as a flu vaccination both of which she declines.  She is also due for an HIV test and a hepatitis C testing both of which she declines.  Her mammogram is up-to-date having had one in September 2019.    Healthy Habits:   Regular Exercise: No  Sunscreen Use: Yes  Healthy Diet: No  Dental Visits Regularly: Yes  Seat Belt: Yes  Sexually  active: Yes  Self Breast Exam Monthly:No  Hemoccults: N/A  Flex Sig: N/A  Colonoscopy: N/A  Lipid Profile: N/A  Glucose Screen: N/A  Prevention of Osteoporosis: Yes  Last Dexa: No  Guns at Home:  No      Immunization History   Administered Date(s) Administered     Influenza, inj, historic,unspecified 2004     Influenza,seasonal quad, PF, =/> 6months 2014     Influenza,seasonal, Inj IIV3 10/23/2006, 2007, 10/23/2008, 2009, 2010, 10/19/2011, 10/02/2012, 2013     Tdap 2007, 2019     Immunization status: up to date and documented, Refuses Immunization, patient is due for flu vaccination as well as a shingles vaccination which she declines both of those..    No exam data present    Gynecologic History  2019  Contraception: none  Last Pap: Unknown. Results were: normal  Last mammogram: 2019. Results were: normal      OB History    Para Term  AB Living   4 2 2 0 2 2   SAB TAB Ectopic Multiple Live Births   2 0 0 0 2      # Outcome Date GA Lbr Tamir/2nd Weight Sex Delivery Anes PTL Lv   4 SAB            3 SAB            2 Term            1 Term                Current Outpatient Medications   Medication Sig Dispense Refill     levothyroxine (SYNTHROID, LEVOTHROID) 75 MCG tablet TAKE ONE TABLET BY MOUTH EVERY DAY AT 6 AM 90 tablet 3     venlafaxine (EFFEXOR-XR) 150 MG 24 hr capsule Take 1 capsule (150 mg total) by mouth daily. 90 capsule 1     ibuprofen (ADVIL,MOTRIN) 200 MG tablet Take 200 mg by mouth every 6 (six) hours as needed for pain.       No current facility-administered medications for this visit.      Past Medical History:   Diagnosis Date     Alcohol abuse     sober now     Gallstone pancreatitis 2017    had gallbladder removed     Genital herpes      Major depressive disorder, recurrent episode, unspecified      Normal delivery     x2     Unspecified hypothyroidism      Unspecified spontaneous  without mention of complication     x2      Past Surgical History:   Procedure Laterality Date     LAPAROSCOPIC CHOLECYSTECTOMY N/A 2/6/2017    Procedure: CHOLECYSTECTOMY, LAPAROSCOPIC;  Surgeon: Sandeep Ariza MD;  Location: [facility];  Service:      REDUCTION MAMMAPLASTY  2006     Penicillins  Family History   Problem Relation Age of Onset     Alcohol abuse Father      Osteoarthritis Father      Anemia Maternal Grandfather      Thyroid disease Maternal Grandfather      Anemia Maternal Aunt      Macular degeneration Paternal Grandmother      Hypertension Paternal Grandmother      Pancreatic cancer Paternal Uncle      Osteoarthritis Mother      Thyroid disease Maternal Grandmother      Social History     Socioeconomic History     Marital status:      Spouse name: Logan     Number of children: 2     Years of education: Not on file     Highest education level: Not on file   Occupational History     Occupation: /   Social Needs     Financial resource strain: Not on file     Food insecurity:     Worry: Not on file     Inability: Not on file     Transportation needs:     Medical: Not on file     Non-medical: Not on file   Tobacco Use     Smoking status: Never Smoker     Smokeless tobacco: Never Used   Substance and Sexual Activity     Alcohol use: No     Comment: previous history of heavier alcohol use, sober now     Drug use: No     Sexual activity: Yes     Partners: Male     Birth control/protection: Surgical     Comment: vasectomy   Lifestyle     Physical activity:     Days per week: Not on file     Minutes per session: Not on file     Stress: Not on file   Relationships     Social connections:     Talks on phone: Not on file     Gets together: Not on file     Attends Christian service: Not on file     Active member of club or organization: Not on file     Attends meetings of clubs or organizations: Not on file     Relationship status: Not on file     Intimate partner violence:     Fear of current or ex  "partner: Not on file     Emotionally abused: Not on file     Physically abused: Not on file     Forced sexual activity: Not on file   Other Topics Concern     Not on file   Social History Narrative     Not on file            Little interest or pleasure in doing things: Not at all  Feeling down, depressed, or hopeless: Several days  Trouble falling or staying asleep, or sleeping too much: Not at all  Feeling tired or having little energy: Several days  Poor appetite or overeating: Several days  Feeling bad about yourself - or that you are a failure or have let yourself or your family down: Nearly every day  Trouble concentrating on things, such as reading the newspaper or watching television: Not at all  Moving or speaking so slowly that other people could have noticed. Or the opposite - being so fidgety or restless that you have been moving around a lot more than usual: Not at all  Thoughts that you would be better off dead, or of hurting yourself in some way: Not at all  PHQ-9 Total Score: 6  If you checked off any problems, how difficult have these problems made it for you to do your work, take care of things at home, or get along with other people?: Somewhat difficult     Total MIKO 7 Score       1/17/2020             MIKO 7 Total Score:  12            Objective:         Vitals:    01/17/20 0802   BP: 102/72   Pulse: 80   SpO2: 95%   Weight: 222 lb (100.7 kg)   Height: 5' 8.5\" (1.74 m)     Body mass index is 33.26 kg/m .       REVIEW OF SYSTEMS:   Denies fever, chills, visual changes, fatigue, myalgias, nasal congestion, rhinorrhea, ear pain or discharge, sore throat, swollen glands, breast mass, nipple discharge, breast changes, abdominal pain, nausea, vomiting, diarrhea, constipation, cough, shortness of breath, chest pain, weight change, change in bowel habits, melena, rectal bleeding, dysuria, frequency, urgency, hematuria, polyuria, polydipsia, polyphagia, joint pain or swelling or erythema, edema, rash, " "weakness, paresthesias, vaginal discharge or bleeding or mood changes.  Remainder of review of systems was negative.      PHYSICAL EXAM:  On exam, patient is a WD, WN 54 y.o. female in NAD.  /72   Pulse 80   Ht 5' 8.5\" (1.74 m)   Wt 222 lb (100.7 kg)   LMP 11/05/2019 (Approximate)   SpO2 95%   Breastfeeding No   BMI 33.26 kg/m    Head normocephalic, atraumatic.  Eyes PERRL, ears TM s clear bilaterally.  Throat without significant erythemia or exudate.  Neck was supple, full range of motion. No significant lymphadenopathy or thyromegaly was appreciated.  Lungs clear to auscultation  Heart regular rate and rhythm.  Breast exam was done. No masses were appreciated. Axilla were clear bilaterally. No nipple discharge was appreciated. Self breast exam was reviewed and taught today.  Abdomen was soft, nontender, nondistended. No masses or organomegaly were palpated. Positive bowel sounds were appreciated.  Extremities with full range of motion of all 4 extremities were noted.  Deep tendon reflexes were equal and symmetrical. Motor and sensation were intact to both the upper and lower extremities.  Cranial nerves 2 through 12 were grossly intact.  EOM were intact.  Pelvic exam was done.  External genitalia appeared normal. Speculum was introduced and the Pap smear obtained. Bimanual exam revealed uterus to be normal size and adenexa without palpable masses.     Recent Results (from the past 240 hour(s))   Hemoglobin   Result Value Ref Range    Hemoglobin 14.1 12.0 - 16.0 g/dL   Lipid Cascade   Result Value Ref Range    Cholesterol 158 <=199 mg/dL    Triglycerides 94 <=149 mg/dL    HDL Cholesterol 57 >=50 mg/dL    LDL Calculated 82 <=129 mg/dL    Patient Fasting > 8hrs? Yes    Thyroid Stimulating Hormone (TSH)   Result Value Ref Range    TSH 1.61 0.30 - 5.00 uIU/mL   T4, Free   Result Value Ref Range    Free T4 0.9 0.7 - 1.8 ng/dL   Urinalysis Macroscopic   Result Value Ref Range    Color, UA Yellow Colorless, " Yellow, Straw, Light Yellow    Clarity, UA Clear Clear    Glucose, UA Negative Negative    Bilirubin, UA Negative Negative    Ketones, UA Negative Negative    Specific Gravity, UA 1.020 1.005 - 1.030    Blood, UA Negative Negative    pH, UA 7.0 5.0 - 8.0    Protein, UA Negative Negative mg/dL    Urobilinogen, UA 0.2 E.U./dL 0.2 E.U./dL, 1.0 E.U./dL    Nitrite, UA Negative Negative    Leukocytes, UA Negative Negative   Gynecologic Cytology (PAP Smear)   Result Value Ref Range    Case Report       Gynecologic Cytology Report                       Case: [id number]                                   Authorizing Provider:  Beronica Serrato MD    Collected:           01/17/2020 0927              Ordering Location:     [facility]       Received:            01/17/2020 0927                                     Family Medicine/OB                                                           First Screen:          Reyna Bran CT                                                                               (ASCP)                                                                       Specimen:    SUREPATH PAP, SCREENING, Endocervical/cervical                                             Interpretation  Negative for squamous intraepithelial lesion or malignancy.      Negative for squamous intraepithelial lesion or malignancy    Result Flag Normal Normal    Specimen Adequacy       Satisfactory for evaluation, endocervical/transformation zone component present    HPV Reflex? Yes regardless of result     HIGH RISK No     LMP/Menopause Date 11/5/2019     Abnormal Bleeding No     Pt Status NA     Birth Control/Hormones None     Previous Normal/Date Unknown     Prev Abn Date/Dx None     Cervical Appearance Normal    HPV High Risk DNA Cervical   Result Value Ref Range    HPV Source SurePath     HPV16 DNA Negative NEG    HPV18 DNA Negative NEG    Other HR HPV Negative NEG    Final Diagnosis SEE NOTES     Specimen Description  Cervical Cells

## 2021-06-07 NOTE — TELEPHONE ENCOUNTER
Please call her and let her know that we did refill her medication for 90 days however she needs to be seen in follow up prior to any further refills.

## 2021-06-07 NOTE — TELEPHONE ENCOUNTER
RN cannot approve Refill Request    RN can NOT refill this medication PCP messaged that patient is overdue for Labs. Last office visit: 7/16/2019 Beronica Serrato MD Last Physical: 1/17/2020 Last MTM visit: Visit date not found Last visit same specialty: 7/16/2019 Beronica Serrato MD.  Next visit within 3 mo: Visit date not found  Next physical within 3 mo: Visit date not found      Ana Ruffin, [facility] Triage/Med Refill 4/23/2020    Requested Prescriptions   Pending Prescriptions Disp Refills     venlafaxine (EFFEXOR-XR) 150 MG 24 hr capsule [Pharmacy Med Name: Venlafaxine HCl  MG Oral Capsule Extended Release 24 Hour] 90 capsule 0     Sig: Take 1 capsule by mouth once daily       Venlafaxine/Desvenlafaxine Refill Protocol Failed - 4/23/2020  4:37 PM        Failed - LFT or AST or ALT in last year     Albumin   Date Value Ref Range Status   04/18/2017 3.7 3.5 - 5.0 g/dL Final     Bilirubin, Total   Date Value Ref Range Status   04/18/2017 0.4 0.0 - 1.0 mg/dL Final     Bilirubin, Direct   Date Value Ref Range Status   02/07/2017 0.2 <=0.5 mg/dL Final     Alkaline Phosphatase   Date Value Ref Range Status   04/18/2017 64 45 - 120 U/L Final     AST   Date Value Ref Range Status   04/18/2017 31 0 - 40 U/L Final     ALT   Date Value Ref Range Status   04/18/2017 38 0 - 45 U/L Final     Protein, Total   Date Value Ref Range Status   04/18/2017 6.9 6.0 - 8.0 g/dL Final                Passed - Fasting lipid cascade in last year     Cholesterol   Date Value Ref Range Status   01/17/2020 158 <=199 mg/dL Final     Triglycerides   Date Value Ref Range Status   01/17/2020 94 <=149 mg/dL Final     HDL Cholesterol   Date Value Ref Range Status   01/17/2020 57 >=50 mg/dL Final     LDL Calculated   Date Value Ref Range Status   01/17/2020 82 <=129 mg/dL Final     Patient Fasting > 8hrs?   Date Value Ref Range Status   01/17/2020 Yes  Final             Passed - PCP or prescribing provider visit in last year      Last office visit with prescriber/PCP: 7/16/2019 Beronica Serrato MD OR same dept: 7/16/2019 Beronica Serrato MD OR same specialty: 7/16/2019 Beronica Serrato MD  Last physical: 1/17/2020 Last MTM visit: Visit date not found   Next visit within 3 mo: Visit date not found  Next physical within 3 mo: Visit date not found  Prescriber OR PCP: Beronica Serrato MD  Last diagnosis associated with med order: 1. Recurrent major depressive disorder, in partial remission (H)  - venlafaxine (EFFEXOR-XR) 150 MG 24 hr capsule [Pharmacy Med Name: Venlafaxine HCl  MG Oral Capsule Extended Release 24 Hour]; Take 1 capsule by mouth once daily  Dispense: 90 capsule; Refill: 0    If protocol passes may refill for 12 months if within 3 months of last provider visit (or a total of 15 months).             Passed - Blood Pressure in last year     BP Readings from Last 1 Encounters:   01/17/20 102/72

## 2021-06-07 NOTE — TELEPHONE ENCOUNTER
Fax came in on patient form [facility] pharmacy for venlafaxine er 150 mg cap. Fax in [employer] inbox

## 2021-06-07 NOTE — TELEPHONE ENCOUNTER
Prescription resent.   Prescription sent to pharmacy electronically. Please call and let patient know this should be available for  at the pharmacy.

## 2021-06-07 NOTE — TELEPHONE ENCOUNTER
Medication Question or Clarification  Who is calling: Patient  What medication are you calling about (include dose and sig)?:   venlafaxine (EFFEXOR-XR) 150 MG 24 hr capsule  90 capsule  0  3/16/2020      Sig: TAKE 1 CAPSULE BY MOUTH ONCE DAILY     Sent to pharmacy as: venlafaxine  mg capsule,extended release 24 hr (EFFEXOR-XR)     E-Prescribing Status: Receipt confirmed by pharmacy (3/16/2020 11:31 AM CDT)         Who prescribed the medication?:   Beronica Serrato MD   What is your question/concern?:   Please re-send script as Pharmacy states they did not receive above medication script.  Thank you.  Requested Pharmacy: [id number]  Okay to leave a detailed message?: Yes

## 2021-06-08 NOTE — ANESTHESIA POSTPROCEDURE EVALUATION
Patient: Bonny Brooks  CHOLECYSTECTOMY, LAPAROSCOPIC  Anesthesia type: general    Patient location: PACU  Last vitals:   Vitals:    02/06/17 1843   BP: 124/88   Pulse: 90   Resp: 22   Temp: 36.7  C (98.1  F)   SpO2: 92%     Post vital signs: stable  Level of consciousness: awake, oriented and responds to simple questions  Post-anesthesia pain: pain controlled  Post-anesthesia nausea and vomiting: no  Pulmonary: unassisted, return to baseline, face mask  Cardiovascular: stable and blood pressure at baseline  Hydration: adequate  Anesthetic events: no    QCDR Measures:  ASA# 11 - Nichole-op Cardiac Arrest: ASA11B - Patient did NOT experience unanticipated cardiac arrest  ASA# 12 - Nichole-op Mortality Rate: ASA12B - Patient did NOT die  ASA# 13 - PACU Re-Intubation Rate: ASA13B - Patient did NOT require a new airway mgmt  ASA# 10 - Composite Anes Safety: ASA10A - No serious adverse event  ASA# 38 - New Corneal Injury: ASA38A - No new exposure keratitis or corneal abrasion in PACU    Additional Notes:

## 2021-06-08 NOTE — PROGRESS NOTES
"Bonny Brooks is a 54 y.o. female who is being evaluated via a billable telephone visit.      The patient has been notified of following:     \"This telephone visit will be conducted via a call between you and your physician/provider. We have found that certain health care needs can be provided without the need for a physical exam.  This service lets us provide the care you need with a short phone conversation.  If a prescription is necessary we can send it directly to your pharmacy.  If lab work is needed we can place an order for that and you can then stop by our lab to have the test done at a later time.    Telephone visits are billed at different rates depending on your insurance coverage. During this emergency period, for some insurers they may be billed the same as an in-person visit.  Please reach out to your insurance provider with any questions.    If during the course of the call the physician/provider feels a telephone visit is not appropriate, you will not be charged for this service.\"    Patient has given verbal consent to a Telephone visit? Yes    What phone number would you like to be contacted at? [phone]    Patient would like to receive their AVS by AVS Preference: [place].      Chief Complaint   Patient presents with     Medication Management     Allergies   Allergen Reactions     Penicillins Hives     Past Medical History:   Diagnosis Date     Alcohol abuse     sober now     Gallstone pancreatitis 2017    had gallbladder removed     Genital herpes      Major depressive disorder, recurrent episode, unspecified      Normal delivery     x2     Unspecified hypothyroidism      Unspecified spontaneous  without mention of complication     x2     Past Surgical History:   Procedure Laterality Date     LAPAROSCOPIC CHOLECYSTECTOMY N/A 2017    Procedure: CHOLECYSTECTOMY, LAPAROSCOPIC;  Surgeon: Sandeep Ariza MD;  Location: [facility];  Service:      REDUCTION MAMMAPLASTY   "     Family History   Problem Relation Age of Onset     Alcohol abuse Father      Osteoarthritis Father      Anemia Maternal Grandfather      Thyroid disease Maternal Grandfather      Anemia Maternal Aunt      Macular degeneration Paternal Grandmother      Hypertension Paternal Grandmother      Pancreatic cancer Paternal Uncle      Osteoarthritis Mother      Thyroid disease Maternal Grandmother        Current Outpatient Medications:      ibuprofen (ADVIL,MOTRIN) 200 MG tablet, Take 200 mg by mouth every 6 (six) hours as needed for pain., Disp: , Rfl:      levothyroxine (SYNTHROID, LEVOTHROID) 75 MCG tablet, TAKE ONE TABLET BY MOUTH EVERY DAY AT 6 AM, Disp: 90 tablet, Rfl: 3     multivitamin therapeutic tablet, Take 1 tablet by mouth daily., Disp: , Rfl:      venlafaxine (EFFEXOR-XR) 150 MG 24 hr capsule, Take 1 capsule (150 mg total) by mouth daily., Disp: 90 capsule, Rfl: 1       Little interest or pleasure in doing things: Several days  Feeling down, depressed, or hopeless: Several days  Trouble falling or staying asleep, or sleeping too much: More than half the days  Feeling tired or having little energy: Not at all  Poor appetite or overeating: Nearly every day  Feeling bad about yourself - or that you are a failure or have let yourself or your family down: Several days  Trouble concentrating on things, such as reading the newspaper or watching television: Not at all  Moving or speaking so slowly that other people could have noticed. Or the opposite - being so fidgety or restless that you have been moving around a lot more than usual: Not at all  Thoughts that you would be better off dead, or of hurting yourself in some way: Not at all  PHQ-9 Total Score: 8  If you checked off any problems, how difficult have these problems made it for you to do your work, take care of things at home, or get along with other people?: Not difficult at all      Total MIKO 7 Score       6/16/2020             MIKO 7 Total Score:  10         Additional provider notes:   Patient is seen today via telephone visit rather than office visit due to the COVID 19 outbreak pandemic.  This is done for the protection of patient from potential exposure to this virus by coming to the clinic.  Patient is being evaluated today for chronic medical problems.  She continues on Effexor for her depression.  Overall things seem to be going well.  Please see PHQ 9 and MIKO which are both completed in their entirety today.  She feels like overall things are doing great.  She is been going to work every day and has not had to change much of her schedule due to the pandemic and she is quite thankful for that.  She has seen the change in routine affect lots of her friends emotionally and so she is quite thankful that she has been able to go to work on a daily basis.  She continues on Effexor 150 mg a day.  She does not have any side effects to that medication.  She is not suicidal or homicidal.  She feels like things are very stable.  Her PHQ 9 and MIKO are essentially unchanged from what they were the last time we did them.  She would very much like to continue on this medication.  She also has a history of hypothyroidism and continues on thyroid medication on a daily basis.  We last did a thyroid level in January which was normal and she has not changed her dose of medication for quite some time and therefore will not repeat that today she does note that she has some constipation for which she has been taking some supplements and that seems to be helping.  She is quite pleased with how everything is going and would very much like to continue on the medication.    Assessment/Plan:  1. Recurrent major depressive disorder, in partial remission (H)  - venlafaxine (EFFEXOR-XR) 150 MG 24 hr capsule; Take 1 capsule (150 mg total) by mouth daily.  Dispense: 90 capsule; Refill: 1    2. Hypothyroidism, unspecified type     Patient is overall doing well.  She continues on Effexor for  her depression which seems to be working well.  She does not have any side effects to the medication would very much like to continue on that.  Please see PHQ 9 and MIKO which are both completed their entirety today.  She is not suicidal or homicidal.  Refill of Effexor was sent to the pharmacy today.  3-month supply was given with 1 refill which should take her through the next 6 months.  She is due to come back in in January for a physical exam.  If she needs a refill of her medication prior to her physical she certainly should let me know.  We will plan to check her thyroid levels at that time as well as she seems to be doing fine is having no symptoms currently that her thyroid levels are off.  If that changes or if she needs additional refills of her thyroid medication she certainly should let me know as well.  She is quite pleased with how everything is going and if things change will let me know but otherwise we will see her in January for a physical exam.  All of her questions were answered today.    Phone call duration:  10 minutes    Beronica Serrato MD

## 2021-06-08 NOTE — ANESTHESIA CARE TRANSFER NOTE
Last vitals:   Vitals:    02/06/17 1650   BP: 133/80   Pulse: (!) 119   Resp: 20   Temp: (!) 38.1  C (100.6  F)   SpO2: 90%     Patient's level of consciousness is awake  Spontaneous respirations: yes  Maintains airway independently: yes  Dentition unchanged: yes  Oropharynx: oropharynx clear of all foreign objects    QCDR Measures:  ASA# 20 - Surgical Safety Checklist: ASA20A - Safety Checks Done  PQRS# 430 - Adult PONV Prevention: 4558F - Pt received => 2 anti-emetic agents (different classes) preop & intraop  ASA# 8 - Peds PONV Prevention: 4558F - Pt received => 2 anti-emetic agents (different classes) preop & intraop  PQRS# 424 - Nichole-op Temp Management: 4559F - At least one body temp DOCUMENTED => 35.5C or 95.9F within required timeframe  PQRS# 426 - PACU Transfer Protocol: - Transfer of care checklist used  ASA# 14 - Acute Post-op Pain: ASA14B - Patient did NOT experience pain >= 7 out of 10   The patient is Spont Breathing, responding to commands,  Reflexes  Intact.  VSS      I completed my SBAR handoff to the receiving nurse per policy and procedure.

## 2021-06-08 NOTE — ANESTHESIA PREPROCEDURE EVALUATION
Anesthesia Evaluation      Patient summary reviewed   No history of anesthetic complications     Airway   Mallampati: II  Neck ROM: full   Pulmonary - normal exam                          Cardiovascular - negative ROS and normal exam  Rhythm: regular  Rate: normal,         Neuro/Psych - negative ROS     Endo/Other    (+) hypothyroidism, obesity,      GI/Hepatic/Renal - negative ROS           Dental - normal exam                        Anesthesia Plan  Planned anesthetic: general endotracheal  Propofol induction  ASA 2   Induction: intravenous   Anesthetic plan and risks discussed with: patient    Post-op plan: routine recovery

## 2021-06-10 NOTE — PROGRESS NOTES
PROGRESS NOTE   4/18/2017    SUBJECTIVE:  Bonny Brooks is a 51 y.o. female  who presents for   Chief Complaint   Patient presents with     Depression     recheck     Patient comes in today for follow-up of her depression.  In the last few months she has been hospitalized for gallstone pancreatitis and had a cholecystectomy.  The last time we rechecked her electrolytes and liver functions her liver functions were still elevated and therefore I would like to repeat those today.  She notes that overall she is feeling much better.  Pretty much immediately when she got the gallbladder out she started to feel better.  She is having some difficulty with the gastrointestinal issues that go along with not having a gallbladder but overall she feels like things are doing well.  She notes that she is lost about 18 pounds since her gallbladder incident due to the fact that she can eat like she did before she had surgery.  She also is here to follow-up on her depression which seems to be doing well.  She continues on Effexor which is working well.  She is not having any side effects to this medication.  Please see PHQ 9 and MIKO which were both completed in their entirety today.  She is not suicidal or homicidal.  She does note that her depression was bad for a little while during the last 6 months or so but it seems to be much better again now.  She would very much like to continue on the Effexor and will go ahead and give her a refill of that today.  She is also on thyroid medications and those seem to working fine as well.  She just had her thyroid level drawn about 6 months ago and it was normal and she has been on the same dose of thyroid medication for quite some time so will not repeat that today.  We will wait and repeat that at her next visit.    Patient Active Problem List   Diagnosis     Gallstone pancreatitis     Fever, unspecified fever cause     Cholelithiasis     Hypothyroidism, unspecified type       Current  "Outpatient Prescriptions   Medication Sig Dispense Refill     levothyroxine (SYNTHROID, LEVOTHROID) 75 MCG tablet Take 1 tablet (75 mcg total) by mouth daily. 90 tablet 1     UNABLE TO FIND Take 1 packet by mouth daily. Med Name: Plexus slim-   1 packet mixed in water daily       venlafaxine (EFFEXOR-XR) 150 MG 24 hr capsule Take 1 capsule (150 mg total) by mouth daily. 90 capsule 1     L.ACID/L.CASEI/B.BIF/B.MARKIE/FOS (PROBIOTIC BLEND ORAL) Take 1 tablet by mouth daily.       multivitamin therapeutic (THERAGRAN) tablet Take 1 tablet by mouth daily.       No current facility-administered medications for this visit.        Allergies   Allergen Reactions     Penicillins Hives       Past Medical History:   Diagnosis Date     Alcohol abuse     sober now     Gallstone pancreatitis 2017    had gallbladder removed     Major depressive disorder, recurrent episode, unspecified      Normal delivery     x2     Unspecified hypothyroidism      Unspecified spontaneous  without mention of complication     x2       Past Surgical History:   Procedure Laterality Date     LAPAROSCOPIC CHOLECYSTECTOMY N/A 2017    Procedure: CHOLECYSTECTOMY, LAPAROSCOPIC;  Surgeon: Sandeep Ariza MD;  Location: [facility];  Service:      REDUCTION MAMMAPLASTY         History   Smoking Status     Former Smoker     Packs/day: 0.10     Years: 4.00     Quit date: 1995   Smokeless Tobacco     Never Used       OBJECTIVE:     /70 (Patient Site: Right Arm, Patient Position: Sitting, Cuff Size: Adult Regular)  Pulse 68 Comment: regular  Temp 98.1  F (36.7  C) (Oral)   Resp 14 Comment: regular  Ht 5' 8\" (1.727 m)  Wt 202 lb 12.8 oz (92 kg)  BMI 30.84 kg/m2    Physical Exam:  GENERAL APPEARANCE: A&A, NAD, well hydrated, well nourished  SKIN:  Normal skin turgor, no lesions/rashes   CV: RRR, no M/G/R   LUNGS: CTAB  ABDOMEN: S&NT, no masses or organomegaly, positive bowel sounds   EXTREMITY: no edema   NEURO: no gross " deficits   PSYCHIATRIC;  Mood appropriate, memory intact    LABS:     Recent Results (from the past 240 hour(s))   Comprehensive Metabolic Panel   Result Value Ref Range    Sodium 139 136 - 145 mmol/L    Potassium 4.1 3.5 - 5.0 mmol/L    Chloride 103 98 - 107 mmol/L    CO2 29 22 - 31 mmol/L    Anion Gap, Calculation 7 5 - 18 mmol/L    Glucose 93 70 - 125 mg/dL    BUN 11 8 - 22 mg/dL    Creatinine 0.73 0.60 - 1.10 mg/dL    GFR MDRD Af Amer >60 >60 mL/min/1.73m2    GFR MDRD Non Af Amer >60 >60 mL/min/1.73m2    Bilirubin, Total 0.4 0.0 - 1.0 mg/dL    Calcium 9.5 8.5 - 10.5 mg/dL    Protein, Total 6.9 6.0 - 8.0 g/dL    Albumin 3.7 3.5 - 5.0 g/dL    Alkaline Phosphatase 64 45 - 120 U/L    AST 31 0 - 40 U/L    ALT 38 0 - 45 U/L       ASSESSMENT/PLAN:     Gallstone pancreatitis [K85.10]      1. Gallstone pancreatitis  - Comprehensive Metabolic Panel    2. Depression  - venlafaxine (EFFEXOR-XR) 150 MG 24 hr capsule; Take 1 capsule (150 mg total) by mouth daily.  Dispense: 90 capsule; Refill: 1    Patient overall seems to be doing very well.  In terms of her depression she continues on Effexor 150 mg which is working well.  I did go ahead and refill her Effexor for 3 month supply with one refill which should take her through the next 6 months.  She denies that she is suicidal or homicidal at this time.  She has any changes in her symptoms she should let me know right away.  We otherwise will see her in about 6 months for follow-up.  She does have a history of gallstone pancreatitis with her gallbladder ultimately being removed a few months ago.  She has not had any labs drawn since that time and therefore conference of metabolic panel was drawn today to ensure that her liver functions have returned to normal.  There was still mildly elevated with the last time they were checked.  Will contact her with results of the liver function tests and the metabolic panel when it returns.  She overall feels like things are doing very  well and really has no concerns today.  We will see her back in about 6 months for follow-up at which time she will also need thyroid levels drawn.  All of her questions were answered today.  If she has additional questions or concerns will certainly let me know.  Beronica Serrato MD

## 2021-06-14 NOTE — TELEPHONE ENCOUNTER
Please call her and let her know that we did refill her medication for 30 days however she needs to be seen in follow up prior to any further refills.

## 2021-06-14 NOTE — PROGRESS NOTES
PROGRESS NOTE   12/6/2017    SUBJECTIVE:  Bonny Brooks is a 52 y.o. female  who presents for   Chief Complaint   Patient presents with     Hypothyroidism     recheck/refill     Depression     recheck     Patient comes in today for follow-up of her chronic medical problems.  She continues on Effexor for depression.  That is working well for her.  She has been on this for many many years and so really does not know what it is like to be off of it.  She is not suicidal or homicidal.  She will occasionally feel like something awful might happen but that is a fleeting thought and is not something that she dwells on at all.  She admits that occasionally she will have a fleeting thought that should be better off dead but nothing that lingers or that she is concerned about.  She has lots of supportive family around her.  She is having issues with her son at this point and that is primarily where the deviation in the answers comes from.  She overall feels like she is tolerating this medication without any problems.  Again she has been on this for a long time and would like to continue on the medication.  She also continues on her levothyroxine.  She does need thyroid levels to be drawn today as it has been over a year since she has had those drawn.  She does not feel like her thyroid is off.  She has been on this particular dose of thyroid medication for a long time as well.  She does note that the right side of her face seems to get red and seems like it is breaking out a little bit is not itchy in any way but she is wondering if there something that can be done about that.  She had her gallbladder removed in February and had pancreatitis at that same time.  She notes that she is completely improved from that and she is no longer having any symptoms from her gallbladder being removed.  She is quite thankful for that.  Chart review notes that she is due for mammogram and will order that today.    Patient Active Problem  "List   Diagnosis     Gallstone pancreatitis     Fever, unspecified fever cause     Cholelithiasis     Hypothyroidism, unspecified type       Current Outpatient Prescriptions   Medication Sig Dispense Refill     L.ACID/L.CASEI/B.BIF/B.MARKIE/FOS (PROBIOTIC BLEND ORAL) Take 1 tablet by mouth daily.       levothyroxine (SYNTHROID, LEVOTHROID) 75 MCG tablet Take 1 tablet (75 mcg total) by mouth Daily at 6:00 am. 90 tablet 3     venlafaxine (EFFEXOR-XR) 150 MG 24 hr capsule TAKE 1 CAPSULE BY MOUTH DAILY 90 capsule 1     No current facility-administered medications for this visit.        Allergies   Allergen Reactions     Penicillins Hives       Past Medical History:   Diagnosis Date     Alcohol abuse     sober now     Gallstone pancreatitis 2017    had gallbladder removed     Major depressive disorder, recurrent episode, unspecified      Normal delivery     x2     Unspecified hypothyroidism      Unspecified spontaneous  without mention of complication     x2       Past Surgical History:   Procedure Laterality Date     LAPAROSCOPIC CHOLECYSTECTOMY N/A 2017    Procedure: CHOLECYSTECTOMY, LAPAROSCOPIC;  Surgeon: Sandeep Ariza MD;  Location: [facility];  Service:      REDUCTION MAMMAPLASTY         History   Smoking Status     Former Smoker     Packs/day: 0.10     Years: 4.00     Quit date: 1995   Smokeless Tobacco     Never Used       OBJECTIVE:     /78 (Patient Site: Left Arm, Patient Position: Sitting, Cuff Size: Adult Regular)  Pulse 76 Comment: regular  Temp 98.1  F (36.7  C) (Oral)   Resp 14 Comment: regular  Ht 5' 8\" (1.727 m)  Wt 199 lb 3.2 oz (90.4 kg)  BMI 30.29 kg/m2    Physical Exam:  GENERAL APPEARANCE: A&A, NAD, well hydrated, well nourished  SKIN:  Normal skin turgor, no lesions/rashes   On exam of her face the cheek region is more red on the right side than on the left side.  The area seems to be eczematous and exhibit signs of dry skin.  There is no evidence for any " secondary infection at all in this area.  Neck was supple, full range of motion.  No thyromegaly was appreciated.  CV: RRR, no M/G/R   LUNGS: CTAB  EXTREMITY: no swelling noted.  Full range of motion of all 4 extremities.   NEURO: no gross deficits   PSYCHIATRIC;  Mood appropriate, memory intact  A&O x3, thought processes congruent, non-tangential. No hallucinations/delusions. Insight/judgment: intact. Denies suicidal/homicidal ideations.      LABS:     Recent Results (from the past 240 hour(s))   Thyroid Stimulating Hormone (TSH)   Result Value Ref Range    TSH 2.04 0.30 - 5.00 uIU/mL   T4, Free   Result Value Ref Range    Free T4 1.0 0.7 - 1.8 ng/dL       ASSESSMENT/PLAN:     Depression [F32.9]      1. Depression  - venlafaxine (EFFEXOR-XR) 150 MG 24 hr capsule; TAKE 1 CAPSULE BY MOUTH DAILY  Dispense: 90 capsule; Refill: 1    2. Hypothyroidism  - levothyroxine (SYNTHROID, LEVOTHROID) 75 MCG tablet; Take 1 tablet (75 mcg total) by mouth Daily at 6:00 am.  Dispense: 90 tablet; Refill: 3  - Thyroid Stimulating Hormone (TSH)  - T4, Free    3. Visit for screening mammogram  - Mammo Screening Bilateral; Future    4. Eczema      Patient is overall doing well.  She has been on Effexor for quite some time for her depression and anxiety it seems like is working well.  She currently is having some issues with her son but is dealing with those through counseling and feels like overall things are doing well.  She is not suicidal or homicidal.  Will refill her Effexor today.  3 month supply was given with 1 refill which should take her through the next 6 months.  We did also draw thyroid levels today as it has been over a year since we have drawn those.  Will contact her with results of those when they return.  I did refill her thyroid medication today as well.  I did also order mammogram and someone should contact her regarding getting that scheduled.  I also gave her a card as to how to go about scheduling it on her own as  well.  She is feeling well in terms of her gallbladder issues and feels like she is back to herself and in fact has gained weight again which she is a little disgusted with.  In terms of the face especially the right side cheek region I think this is dry skin and have suggested that she use some Eucerin lotion to this area and if that is not helpful she certainly should let us know.  All of her questions were answered today.  We will see her back in about 6 months for follow-up of her depression.  Beronica Serrato MD

## 2021-06-14 NOTE — PROGRESS NOTES
Bonny Brooks is a 55 y.o. female who is being evaluated via a billable video visit.      How would you like to obtain your AVS? MyChart.  If dropped from the video visit, the video invitation should be resent by: Text to cell phone: [phone]  Will anyone else be joining your video visit? No      Video Start Time: 1256 pm    Subjective     Patient is seen today via video visit rather than office visit due to the COVID 19 outbreak pandemic.  This is done for the protection of patient from potential exposure to this virus by coming to the clinic.    Chief Complaint   Patient presents with     Medication Refill     MED CHECK     Allergies   Allergen Reactions     Penicillins Hives     Past Medical History:   Diagnosis Date     Alcohol abuse     sober now     Gallstone pancreatitis 2017    had gallbladder removed     Genital herpes      Major depressive disorder, recurrent episode, unspecified      Normal delivery     x2     Unspecified hypothyroidism      Unspecified spontaneous  without mention of complication     x2     Past Surgical History:   Procedure Laterality Date     LAPAROSCOPIC CHOLECYSTECTOMY N/A 2017    Procedure: CHOLECYSTECTOMY, LAPAROSCOPIC;  Surgeon: Sandeep Ariza MD;  Location: [facility];  Service:      REDUCTION MAMMAPLASTY       Family History   Problem Relation Age of Onset     Alcohol abuse Father      Osteoarthritis Father      Anemia Maternal Grandfather      Thyroid disease Maternal Grandfather      Anemia Maternal Aunt      Macular degeneration Paternal Grandmother      Hypertension Paternal Grandmother      Pancreatic cancer Paternal Uncle      Osteoarthritis Mother      Thyroid disease Maternal Grandmother        Current Outpatient Medications:      levothyroxine (SYNTHROID, LEVOTHROID) 75 MCG tablet, TAKE 1 TABLET BY MOUTH ONCE DAILY AT 6 AM, Disp: 90 tablet, Rfl: 1     multivitamin therapeutic tablet, Take 1 tablet by mouth daily., Disp: , Rfl:       venlafaxine (EFFEXOR-XR) 150 MG 24 hr capsule, Take 1 capsule (150 mg total) by mouth daily., Disp: 90 capsule, Rfl: 1     ibuprofen (ADVIL,MOTRIN) 200 MG tablet, Take 200 mg by mouth every 6 (six) hours as needed for pain., Disp: , Rfl:        Patient is being evaluated today for follow-up of her chronic medical problems which include hypothyroidism as well as depression.  She overall feels like things are doing okay.  She was very very frustrated during the time of the election and is starting to feel little bit better now that the election is settled.  She still is having great difficulty excepting the results of the election but is working through that.  She notes on several different occasions that if I had asked her how her depression was going a couple of months ago during the election around the time of the election to her answers would have been way different.  Please see PHQ 9 and MIKO which are both completed in their entirety today.  She is not suicidal and she is not homicidal.  She feels like things are relatively stable.  She does wish to continue on her Effexor.  She is tolerating that well and does not have any side effects from that.  She also has a history of hypothyroidism.  She is having no symptoms that be suggestive of her thyroid level being off.  She knows that she still needs to take her thyroid medication because she forgot to take it 1 day and she definitely felt like something was off that entire day.  She got back on the medication and now she is feeling fine.  She notes that we have not changed the dose of her medication in several years.  She overall feels like things are doing well and really has no concerns or questions today.        Objective       Vitals:  No vitals were obtained today due to virtual visit.    A&O x3, thought processes congruent, non-tangential. No hallucinations/delusions. Insight/judgment: intact. Denies suicidal/homicidal ideations.    Little interest or  pleasure in doing things: Several days  Feeling down, depressed, or hopeless: More than half the days  Trouble falling or staying asleep, or sleeping too much: Several days  Feeling tired or having little energy: More than half the days  Poor appetite or overeating: Not at all  Feeling bad about yourself - or that you are a failure or have let yourself or your family down: Several days  Trouble concentrating on things, such as reading the newspaper or watching television: Not at all  Moving or speaking so slowly that other people could have noticed. Or the opposite - being so fidgety or restless that you have been moving around a lot more than usual: Not at all  Thoughts that you would be better off dead, or of hurting yourself in some way: Not at all  PHQ-9 Total Score: 7  If you checked off any problems, how difficult have these problems made it for you to do your work, take care of things at home, or get along with other people?: Not difficult at all    Total MIKO 7 Score       2/2/2021             MIKO 7 Total Score:  5          ASSESSMENT/PLAN:    Hypothyroidism, unspecified type [E03.9]     1. Hypothyroidism, unspecified type  - Thyroid Stimulating Hormone (TSH); Future  - T4, Free; Future  - levothyroxine (SYNTHROID, LEVOTHROID) 75 MCG tablet; TAKE 1 TABLET BY MOUTH ONCE DAILY AT 6 AM  Dispense: 90 tablet; Refill: 1    2. Recurrent major depressive disorder, in partial remission (H)  - venlafaxine (EFFEXOR-XR) 150 MG 24 hr capsule; Take 1 capsule (150 mg total) by mouth daily.  Dispense: 90 capsule; Refill: 1    3. Screen for colon cancer  - Ambulatory referral for Colonoscopy      Patient is being evaluated today for follow-up of her chronic medical problems.  She has a history of depression as well as hypothyroidism.  She has not had her thyroid levels drawn for quite some time but she overall feels like things are stable with her thyroid.  She has not had a change in her thyroid medication for quite some  time.  We will go ahead and refill her thyroid medication today.  We will draw thyroid level and free T4.  She will set up an appointment for lab only visit to come in to have these drawn.  I did go ahead and send the prescription to the pharmacy today.  If we need to change her medication I asked that she not  the prescription until after these labs come back.  She does have a few pills at home that she should be able to get by until she has the results of the lab work that was drawn in the next few days.  If she has increasing problems or feels like something is off in terms of her thyroid level she certainly should let me know.  In terms of her depression she seems to be doing fine with that as well.  Please see PHQ 9 and MIKO which are both completed in their entirety today.  She does need a refill of her Effexor today as well.  Refill was given for 3-month supply with 1 refill which should take her through the next 6 months.  I have asked her to return to clinic in about 6 months for follow-up of both these medical problems.  She is also due for a physical exam.  That was discussed with her.  She is overdue for colonoscopy and is agreeable to having me place an order for that which then will have someone call her to schedule a colonoscopy.  We did discuss the fact that it probably will be a while before she can be seen.  We did discuss shingles vaccination she will think about it.  I did discuss with her that she should call her insurance to see what the coverage is like and to find out where she should get the shingles vaccination to maximize her coverage for that.  She declines flu vaccination.  She declines hepatitis C and HIV testing.  All of her questions were answered today.  We should see her back in about 6 months for recheck of her medications but she is also due for a physical exam prior to that.  32 minutes spent on the day of encounter doing chart review, history and exam, counseling,  coordination of care, documentation and other activities as noted.       Video-Visit Details    Type of service:  Video Visit    Video End Time (time video stopped): 115 pm  Originating Location (pt. Location): Sitting in her car at a park near her workplace    Distant Location (provider location):  [facility]     Platform used for Video Visit: Felisa Serrato MD

## 2021-06-14 NOTE — TELEPHONE ENCOUNTER
RN cannot approve Refill Request    RN can NOT refill this medication Protocol failed and NO refill given. Last office visit: 7/16/2019 Beronica Serrato MD Last Physical: 1/17/2020 Last MTM visit: Visit date not found Last visit same specialty: 7/16/2019 Beronica Serrato MD.  Next visit within 3 mo: Visit date not found  Next physical within 3 mo: Visit date not found      Bisi Amador, [facility] Triage/Med Refill 1/5/2021    Requested Prescriptions   Pending Prescriptions Disp Refills     venlafaxine (EFFEXOR-XR) 150 MG 24 hr capsule [Pharmacy Med Name: Venlafaxine HCl  MG Oral Capsule Extended Release 24 Hour] 90 capsule 0     Sig: Take 1 capsule by mouth once daily       Venlafaxine/Desvenlafaxine Refill Protocol Failed - 1/4/2021  4:54 PM        Failed - LFT or AST or ALT in last year     Albumin   Date Value Ref Range Status   04/18/2017 3.7 3.5 - 5.0 g/dL Final     Bilirubin, Total   Date Value Ref Range Status   04/18/2017 0.4 0.0 - 1.0 mg/dL Final     Bilirubin, Direct   Date Value Ref Range Status   02/07/2017 0.2 <=0.5 mg/dL Final     Alkaline Phosphatase   Date Value Ref Range Status   04/18/2017 64 45 - 120 U/L Final     AST   Date Value Ref Range Status   04/18/2017 31 0 - 40 U/L Final     ALT   Date Value Ref Range Status   04/18/2017 38 0 - 45 U/L Final     Protein, Total   Date Value Ref Range Status   04/18/2017 6.9 6.0 - 8.0 g/dL Final                Passed - Fasting lipid cascade in last year     Cholesterol   Date Value Ref Range Status   01/17/2020 158 <=199 mg/dL Final     Triglycerides   Date Value Ref Range Status   01/17/2020 94 <=149 mg/dL Final     HDL Cholesterol   Date Value Ref Range Status   01/17/2020 57 >=50 mg/dL Final     LDL Calculated   Date Value Ref Range Status   01/17/2020 82 <=129 mg/dL Final     Patient Fasting > 8hrs?   Date Value Ref Range Status   01/17/2020 Yes  Final             Passed - PCP or prescribing provider visit in last year     Last  office visit with prescriber/PCP: 7/16/2019 Beronica Serrato MD [facility]: Visit date not found OR same specialty: 7/16/2019 Beronica Serrato MD  Last physical: 1/17/2020 Last MTM visit: Visit date not found   Next visit within 3 mo: Visit date not found  Next physical within 3 mo: Visit date not found  Prescriber OR PCP: Beronica Serrato MD  Last diagnosis associated with med order: 1. Recurrent major depressive disorder, in partial remission (H)  - venlafaxine (EFFEXOR-XR) 150 MG 24 hr capsule [Pharmacy Med Name: Venlafaxine HCl  MG Oral Capsule Extended Release 24 Hour]; Take 1 capsule by mouth once daily  Dispense: 90 capsule; Refill: 0    2. Hypothyroidism, unspecified type  - levothyroxine (SYNTHROID, LEVOTHROID) 75 MCG tablet [Pharmacy Med Name: Levothyroxine Sodium 75 MCG Oral Tablet]; TAKE 1 TABLET BY MOUTH ONCE DAILY AT 6 AM  Dispense: 90 tablet; Refill: 0    If protocol passes may refill for 12 months if within 3 months of last provider visit (or a total of 15 months).             Passed - Blood Pressure in last year     BP Readings from Last 1 Encounters:   01/17/20 102/72                levothyroxine (SYNTHROID, LEVOTHROID) 75 MCG tablet [Pharmacy Med Name: Levothyroxine Sodium 75 MCG Oral Tablet] 90 tablet 0     Sig: TAKE 1 TABLET BY MOUTH ONCE DAILY AT 6 AM       Thyroid Hormones Protocol Passed - 1/4/2021  4:54 PM        Passed - Provider visit in past 12 months or next 3 months     Last office visit with prescriber/PCP: 7/16/2019 Beronica Serrato MD [facility]: Visit date not found OR same specialty: 7/16/2019 Beronica Serrato MD  Last physical: 1/17/2020 Last MTM visit: Visit date not found   Next visit within 3 mo: Visit date not found  Next physical within 3 mo: Visit date not found  Prescriber OR PCP: Beronica Serrato MD  Last diagnosis associated with med order: 1. Recurrent major depressive disorder, in partial remission (H)  - venlafaxine (EFFEXOR-XR) 150 MG  24 hr capsule [Pharmacy Med Name: Venlafaxine HCl  MG Oral Capsule Extended Release 24 Hour]; Take 1 capsule by mouth once daily  Dispense: 90 capsule; Refill: 0    2. Hypothyroidism, unspecified type  - levothyroxine (SYNTHROID, LEVOTHROID) 75 MCG tablet [Pharmacy Med Name: Levothyroxine Sodium 75 MCG Oral Tablet]; TAKE 1 TABLET BY MOUTH ONCE DAILY AT 6 AM  Dispense: 90 tablet; Refill: 0    If protocol passes may refill for 12 months if within 3 months of last provider visit (or a total of 15 months).             Passed - TSH on file in past 12 months for patient age 12 & older     TSH   Date Value Ref Range Status   01/17/2020 1.61 0.30 - 5.00 uIU/mL Final

## 2021-06-16 PROBLEM — F33.41 RECURRENT MAJOR DEPRESSIVE DISORDER, IN PARTIAL REMISSION (H): Status: ACTIVE | Noted: 2019-07-16

## 2021-06-19 NOTE — PROGRESS NOTES
PROGRESS NOTE   7/13/2018    SUBJECTIVE:  Bonny Brooks is a 52 y.o. female  who presents for   Chief Complaint   Patient presents with     Depression     Effexor Refill     Patient comes in today for follow-up of her chronic medical problems.  She has a history of depression is currently on Effexor.  This medication is working well for her.  She just recently moved and so is under a bit more stress currently but overall things seem to be going very well with the Effexor.  Please see PHQ 9 and MIKO which are both completed in their entirety today.  Her scores are little bit higher because of the recent move but overall she feels like things are doing well.  She is not suicidal or homicidal.  She is not having side effects to this medication.  She would very much like to continue on this medication.  She recently moved to [place] to help with her commute to [place] every day.  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  She also has a history of hypothyroidism.  Her thyroid levels were drawn in December 2017 and were within normal limits.  She does need a refill of her thyroid medication which will be given today.  We did discuss colon cancer screening.  She is believes that she has had a colonoscopy in the past but she is not exactly sure of the date.  The colonoscopy that she had was normal.  She believes that it was done before the age of 50.  She is not sure exactly how old she was when this was done.  She did sign a release of information today to get us the colonoscopy results and then we can determine when she is due again for colonoscopy.    Patient Active Problem List   Diagnosis     Gallstone pancreatitis     Fever, unspecified fever cause     Cholelithiasis     Hypothyroidism, unspecified type       Current  Outpatient Prescriptions   Medication Sig Dispense Refill     L.ACID/L.CASEI/B.BIF/B.MARKIE/FOS (PROBIOTIC BLEND ORAL) Take 1 tablet by mouth daily.       levothyroxine (SYNTHROID, LEVOTHROID) 75 MCG tablet Take 1 tablet (75 mcg total) by mouth Daily at 6:00 am. 90 tablet 1     venlafaxine (EFFEXOR-XR) 150 MG 24 hr capsule Take 1 capsule (150 mg total) by mouth daily. 90 capsule 1     No current facility-administered medications for this visit.        Allergies   Allergen Reactions     Penicillins Hives       Past Medical History:   Diagnosis Date     Alcohol abuse     sober now     Gallstone pancreatitis 2017    had gallbladder removed     Major depressive disorder, recurrent episode, unspecified      Normal delivery     x2     Unspecified hypothyroidism      Unspecified spontaneous  without mention of complication     x2       Past Surgical History:   Procedure Laterality Date     LAPAROSCOPIC CHOLECYSTECTOMY N/A 2017    Procedure: CHOLECYSTECTOMY, LAPAROSCOPIC;  Surgeon: Sandeep Ariza MD;  Location: [facility];  Service:      REDUCTION MAMMAPLASTY         History   Smoking Status     Former Smoker     Packs/day: 0.10     Years: 4.00     Quit date: 1995   Smokeless Tobacco     Never Used       OBJECTIVE:     /70 (Patient Site: Left Arm, Patient Position: Sitting, Cuff Size: Adult Regular)  Pulse 72  Wt 215 lb (97.5 kg)  LMP 2018  SpO2 98%  Breastfeeding? No  BMI 32.69 kg/m2    Physical Exam:  GENERAL APPEARANCE: A&A, NAD, well hydrated, well nourished  SKIN:  Normal skin turgor, no lesions/rashes   NECK: Supple, full ROM, no significant lymphadenopathy or thyromegaly  CV: RRR, no M/G/R   LUNGS: CTAB  EXTREMITY: no swelling noted.  Full range of motion of all 4 extremities.   NEURO: no gross deficits   PSYCHIATRIC;  Mood appropriate, memory intact  A&O x3, thought processes congruent, non-tangential. No hallucinations/delusions. Insight/judgment: intact. Denies  suicidal/homicidal ideations.      ASSESSMENT/PLAN:     Depression [F32.9]      1. Depression  - venlafaxine (EFFEXOR-XR) 150 MG 24 hr capsule; Take 1 capsule (150 mg total) by mouth daily.  Dispense: 90 capsule; Refill: 1    2. Hypothyroidism  - levothyroxine (SYNTHROID, LEVOTHROID) 75 MCG tablet; Take 1 tablet (75 mcg total) by mouth Daily at 6:00 am.  Dispense: 90 tablet; Refill: 1    3. Screen for colon cancer    Patient overall is doing well.  Refill of her Effexor was given today.  She seems to be doing very well on that for her depression.  She is not suicidal homicidal.  Please see PHQ 9 and MIKO which are both completed in their entirety today.  She is tolerating this medication well.  3-month supply was given with 1 refill which should take her through the next 6 months.  I also did refill her thyroid medication.  She had thyroid levels drawn in December and these seem to be drawn once a year.  We did discuss the fact that she does not need to come in but once a year for her thyroid medication but for her Effexor she needs to come in every 6 months.  I did very clearly explained to her that she needs to come in in 6 months for follow-up of the Effexor.  She voiced understanding of that.  We will await the results from her colonoscopy that was done several years ago and then can determine when she is due for another colonoscopy.  The colonoscopy that she had was entirely normal per her report.  All of patient's questions were answered today.  We will see her back in 6 months for follow-up.  Beronica Serrato MD

## 2021-06-20 NOTE — PROGRESS NOTES
Assessment:        Throat pain.  Sinus congestion.  Cough.       Plan:       Antitussives per orders.  Trial of nasal steroids and oral decongestants.  Recommended plenty of fluids and rest.  OTC analgesics discussed.  In process over night strep test - will contact ONLY if test is positive.  Discussed signs of worsening symptoms and when to follow-up with PCP if no symptom improvement.       Patient Instructions   Rapid Strep test was negative.     If overnight test returns positive, we will call tomorrow and call in antibiotic prescription.    No notification means that overnight test returned negative.    Symptoms are likely due to viral infection that will resolve on its own in 3-7 days.    May continue with symptomatic treatments including:  -salt water gargles  -warm beverages such as a non-caffeinated tea with honey  -throat drops  -ibuprofen or Tylenol for pain or fever    If fevers not coming down with Tylenol or ibuprofen, shortness of breath, not tolerating oral liquid intake, drooling, or stiff neck, return for evaluation immediately. Otherwise, if no improvement in the next week, follow up with primary care provider.    You were seen today for sinus congestion and/or pain. This is likely due to a viral illness.    Symptoms management:  - May use Tylenol or Ibuprofen for discomfort and/or fever if present  - May try saline irrigation to relieve congestion (see instructions below)  - Use of nasal steroids (Flonase) as prescribed  - If you are experiencing ear fullness, may try an oral decongestant such as sudafed  - Tessalon perles to help with cough suppression    Reasons to come back for re-evaluation:  - Develop a fever of 100.4F or current fever worsens  - Sudden and severe pain in the face and head  - Troubles seeing or double vision  - Swelling or redness around one or both eyes  - Sfiff neck  - Symptoms have not improved after 5 days    Buffered normal saline nasal irrigation   The benefits   1.  Saline (saltwater) washes the mucus and irritants from your nose.   2. The sinus passages are moisturized.   3. Studies have also shown that a nasal irrigation improves cell function (the cells that move the mucus work better).   The recipe   Use a one-quart glass jar that is thoroughly cleansed.   You may use a large medical syringe (30 cc), water pick with an irrigation tip (preferred method), squeeze bottle, or Neti pot. Do not use a baby bulb syringe. The syringe or pick should be sterilized frequently or replaced every two to three weeks to avoid contamination and infection.   Fill with water that has been distilled, previously boiled, or otherwise sterilized. Plain tap water is not recommended, because it is not necessarily sterile.   Add 1 to 1  heaping teaspoons of pickling/allyson salt. Do not use table salt, because it contains a large number of additives.   Add 1 teaspoon of baking soda (pure bicarbonate).   Mix ingredients together, and store at room temperature. Discard after one week.   You may also make up a solution from premixed packets that are commercially prepared specifically for nasal irrigation.   The instructions   Irrigate your nose with saline one to two times per day.   If you have been told to use nasal medication, you should always use your saline solution first. The nasal medication is much more effective when sprayed onto clean nasal membranes, and the spray will reach deeper into the nose.   Pour the amount of fluid you plan to use into a clean bowl. Do not put your used syringe back into the storage container, because it contaminates your solution.   You may warm the solution slightly in the microwave, but be sure that the solution is not hot.   Bend over the sink (some people do this in the shower) and squirt the solution into each side of your nose, aiming the stream toward the back of your head, not the top of your head. The solution should flow into one nostril and out of the  other, but it will not harm you if you swallow a little.   Some people experience a little burning sensation the first few times they use buffered saline solution, but this usually goes away after they adapt to it.           Subjective:        Bonny Brooks is a 53 y.o. female here for evaluation of a cough.  The cough is non-productive, without wheezing, dyspnea or hemoptysis. Onset of symptoms was 3 days ago, gradually worsening since that time.  Associated symptoms include sore throat, fatigue, ear pain, and chills. Patient does not have a history of asthma. Patient has not had recent travel. Patient has history of smoking, but quit. Medications include tylenol cold and flu, ibuprofen, cough drops, and plenty of fluids.    The following portions of the patient's history were reviewed and updated as appropriate: allergies, current medications and problem list.    Review of Systems  Pertinent items are noted in HPI.     Allergies  Allergies   Allergen Reactions     Penicillins Hives          Objective:       /76 (Patient Site: Right Arm, Patient Position: Sitting, Cuff Size: Adult Regular)  Pulse 91  Temp 98.9  F (37.2  C) (Oral)   Resp 22  Wt 214 lb 5 oz (97.2 kg)  SpO2 94%  BMI 32.59 kg/m2  General appearance: alert, appears stated age, cooperative, no distress and non-toxic  Head: Normocephalic, without obvious abnormality, atraumatic, sinuses nontender to percussion, but pressure noted throughout  Ears: TM's intact with mucoid fluid and bulging, no erythema; external ears normal  Nose: no discharge  Throat: post-nasal drip present; no tonsil swelling, erythema, or exudate; MMM, lips and tongue normal  Neck: mild anterior cervical adenopathy and supple, symmetrical, trachea midline  Lungs: clear to auscultation bilaterally and no rhonchi, rales, or wheezing  Heart: regular rate and rhythm, S1, S2 normal, no murmur, click, rub or gallop     Lab Results    Recent Results (from the past 24 hour(s))    Rapid Strep A Screen-Throat swab   Result Value Ref Range    Rapid Strep A Antigen No Group A Strep detected, presumptive negative No Group A Strep detected, presumptive negative     I personally reviewed these results and discussed findings with the patient.

## 2021-06-21 NOTE — PROGRESS NOTES
PROGRESS NOTE   10/12/2018    SUBJECTIVE:  Bonny Brooks is a 53 y.o. female  who presents for   Chief Complaint   Patient presents with     Follow-up     [facility] 10/6/- 10/9       Patient comes in with her  for recheck after having been in [facility] x2 over the last week.  She notes that last Wednesday she started with a little bit of a cough.  It quickly went into typical cold symptoms and she thought she had a cold however she developed a lot of postnasal drip at that point and seem like things were getting worse rather than better.  [facility] last Saturday for her symptoms.  They told her that she probably had a virus.  They did a strep screen that was negative.  At that point she noted that she was feeling really rundown as well as having a postnasal drip as well as cough and cold symptoms.  She is not running a fever at that point.  Once she was discharged from [facility] on Saturday she continued to get worse.  Over the weekend she started having fevers as well.  She had sweats and chills and she was feeling extremely rundown.  She also developed coughing attacks that lasted for 10 or 15 minutes.  She went back to walk-in [facility] on Tuesday morning.  They did a chest x-ray and diagnosed her with a right sided pneumonia.  They put her on both Zithromax as well as Ceftin and asked her to come in today for follow-up.  She notes that she is taken 3 days of antibiotics now.  She does think that the cough is a bit better but she continues to feel really run down.  She lost her voice yesterday as well.  She continues to have a cough which is productive of clear phlegm now.  She also notes that she continues to have lots of postnasal drip.  She did have a fever up to 100.1 yesterday but has not had a fever since then.  She is eating drinking something but not a term normal amounts.  She has been able to keep herself hydrated.    Patient Active Problem List   Diagnosis     Gallstone  "pancreatitis     Fever, unspecified fever cause     Cholelithiasis     Hypothyroidism, unspecified type       Current Outpatient Prescriptions   Medication Sig Dispense Refill     azithromycin (ZITHROMAX) 250 MG tablet Take 500 mg (2 x 250 mg tablets) on day 1 followed by 250 mg (1 tablet) on days 2-5. 6 tablet 0     cefuroxime (CEFTIN) 500 MG tablet Take 1 tablet (500 mg total) by mouth 2 (two) times a day for 10 days. 20 tablet 0     fluticasone (FLONASE) 50 mcg/actuation nasal spray Apply 1 spray into each nostril daily.       L.ACID/L.CASEI/B.BIF/B.MARKIE/FOS (PROBIOTIC BLEND ORAL) Take 1 tablet by mouth daily.       levothyroxine (SYNTHROID, LEVOTHROID) 75 MCG tablet Take 1 tablet (75 mcg total) by mouth Daily at 6:00 am. 90 tablet 1     venlafaxine (EFFEXOR-XR) 150 MG 24 hr capsule Take 1 capsule (150 mg total) by mouth daily. 90 capsule 1     ibuprofen (ADVIL,MOTRIN) 200 MG tablet Take 200 mg by mouth every 6 (six) hours as needed for pain.       phenylephrine/DM/acetaminop/GG (TYLENOL COLD HEAD CONGESTION ORAL) Take by mouth.       No current facility-administered medications for this visit.        Allergies   Allergen Reactions     Penicillins Hives       Past Medical History:   Diagnosis Date     Alcohol abuse     sober now     Gallstone pancreatitis 2017    had gallbladder removed     Genital herpes      Major depressive disorder, recurrent episode, unspecified      Normal delivery     x2     Unspecified hypothyroidism      Unspecified spontaneous  without mention of complication     x2       Past Surgical History:   Procedure Laterality Date     LAPAROSCOPIC CHOLECYSTECTOMY N/A 2017    Procedure: CHOLECYSTECTOMY, LAPAROSCOPIC;  Surgeon: Sandeep Ariza MD;  Location: [facility];  Service:      REDUCTION MAMMAPLASTY         History   Smoking Status     Never Smoker   Smokeless Tobacco     Never Used       OBJECTIVE:     /78  Pulse 74  Temp 98.1  F (36.7  C)  Ht 5' 8\" " (1.727 m)  Wt 214 lb (97.1 kg)  SpO2 94%  Breastfeeding? No  BMI 32.54 kg/m2    Physical Exam:  GENERAL APPEARANCE: A&A, NAD, well hydrated, well nourished  SKIN:  Normal skin turgor, no lesions/rashes   HEENT: moist mucous membranes, no rhinorrhea, PERRLA, TM's clear bilaterally, Throat without significant erythema or exudate.  NECK: Supple, full ROM, no significant lymphadenopathy or thyromegaly  CV: RRR, no M/G/R   LUNGS: Crackles noted in the lower right lung base.  ABDOMEN: S&NT, no masses or organomegaly, positive bowel sounds   EXTREMITY: no swelling noted.  Full range of motion of all 4 extremities.   NEURO: no gross deficits   PSYCHIATRIC;  Mood appropriate, memory intact    LABS:     Recent Results (from the past 240 hour(s))   Rapid Strep A Screen-Throat swab   Result Value Ref Range    Rapid Strep A Antigen No Group A Strep detected, presumptive negative No Group A Strep detected, presumptive negative   Group A Strep, RNA Direct Detection, Throat   Result Value Ref Range    Group A Strep by PCR No Group A Strep rRNA detected No Group A Strep rRNA detected       ASSESSMENT/PLAN:     Pneumonia [J18.9]      1. Pneumonia    Patient comes in today for follow-up after having been diagnosed with pneumonia on 10/9/2018.  She overall seems to be doing well.  She has taken 3 days of her antibiotic course and seems to be improving somewhat.  Her cough seems to be better although she continues to be very rundown.  We talked about the fact that pneumonia definitely takes a lot out of you and probably will feel run down for quite some time.  I encouraged her to finish the antibiotics as prescribed and indeed she does need to take both of the antibiotics.  I would suspect that she would have gradual improvement in her symptoms.  Certainly if she has worsening of her symptoms or develops fevers again she needs to let us know right away.  Her oxygenation saturation today is 94% and she was encouraged that that is a  little low but definitely normal given the fact that she was diagnosed with pneumonia just 3 days ago.  I do not hear any other abnormalities on exam today.  She is healthy and therefore I definitely would suspect that she will continue to have improvement over the next few days.  Her  was inquiring about doing a repeat x-ray at this point to see if her pneumonia is better and I explained to them that often x-rays leg behind in terms of pneumonia and I would suspect that an x-ray would show continued pneumonia but would not be real helpful at this point to see if she is having improvement in her symptoms.  They voiced understanding of that.  If they have additional questions or concerns should certainly let me know.  All of their questions were answered today. Total time spent with patient was at least 25 minutes,  of which greater than fifty percent was spent in counselling and coordination of care of the above medical problems.  Beronica Serrato MD

## 2021-06-21 NOTE — PROGRESS NOTES
"Hearing evaluation in conjunction with ENT exam (Dr. Mcginnis)    History:  Ms. Brooks had history of recent pneumonia, with an ongoing sore throat and a sense of \"drainage\" from the left ear going down the back of the throat. Hearing has been diminished over the past four weeks, since onset of illness. She denied tinnitus, aural fullness, dizziness, otalgia, or history of significant noise exposure.    Results:  Otoscopy was unremarkable in either ear, with likely monomers visible on left tympanic membrane. Hearing sensitivity was assessed with good reliability using both insert and circumaural phones.      Normal hearing sensitivity, bilaterally, for 250-8000 Hz.     Speech reception thresholds showed agreement with pure tone findings in each ear. Word recognition ability was excellent, bilaterally, with presentation levels at typical conversational volume in both ears.    Tympanometry was consistent with normal middle ear function, bilaterally (hypermobile tracings were obtained in both ears).    Recommendations:  Follow-up with ENT/PCP; retest hearing per medical management or patient concern.  Wear hearing protection consistently in noise to preserve residual hearing sensitivity. Reassurance was given. Ms. Brooks expressed verbal understanding of this information and plan.    Jessica Young, [facility]  [place] Licensed Audiologist [id number]        "

## 2021-06-21 NOTE — PROGRESS NOTES
"HISTORY OF PRESENT ILLNESS  Asked to be seen by Dr. Serrato for evaluation of sore throat. Patient reports history of walking pneumonia with extreme cough for 5 or 6 days. She feels that the backside of her throat on her left and the very back. She has chewed gum continuously for years and feels that may have alleviated her sore throat. She was unable to chew during the URI and the throat became very bad. She reported hard time breathing and shortness of breath. She was diagnosed with \"waking pneumonia.\" She was treated with z-anastasiya and cefuroxime. Her sore throat has persisted. She also has what she feels is a drainage in the left ear. She has also felt that she has had a hearing loss in the left ear.     REVIEW OF SYSTEMS  Review of Systems: a 10-system review was performed. Pertinent positives are noted in the HPI and on a separate scanned document in the chart.    PMH, PSH, FH and SH has documented in the EHR.      EXAM    CONSTITUTIONAL  General Appearance:  Normal, well developed, well nourished, no obvious distress  Ability to Communicate:  communicates appropriately.    HEAD AND FACE  Appearance and Symmetry:  Normal, no scalp or facial scarring or suspicious lesions.  Paranasal sinuses tenderness:  Normal, Paranasal sinuses non tender    EARS  Clinical speech reception threshold:  Normal, able to hear normal speech.  Auricle:  Normal, Auricles without scars, lesions, masses.  External auditory canal:  Normal, External auditory canal normal.  Tympanic membrane:  Normal, Tympanic membranes normal without swelling or erythema.  Tympanic membrane mobility:  Normal, Normal tympanic membrane mobility.    NOSE (speculum or scope)  Architecture:  Normal, Grossly normal external nasal architecture with no masses or lesions.  Mucosa:  Normal mucosa, No polyps or masses.  Septum:  Normal, Septum non-obstructing.  Turbinates:  Normal, No turbinate abnormalities    ORAL CAVITY AND OROPHARYNX  Lips:  Normal.  Dental and " gingiva:  Normal, No obvious dental or gingival disease.  Mucosa:  Normal, Moist mucous membranes.  Tongue:  Normal, Tongue mobile with no mucosal abnormalities  Hard and soft palate:  Normal, Hard and soft palate without cleft or mucosal lesions.  Oral pharynx:  Normal, Posterior pharynx without lesions or remarkable asymmetry.  Saliva:  Normal, Clear saliva.  Masses:  Normal, No palpable masses or pathologically enlarged lymph nodes.    LARYNGOSCOPY  Risks and benefit of Flexible laryngeal endoscopy were discussed with the patient and they wished to proceed.  The nose was sprayed with 4% lidocaine / 1% phenylephrine.  After allowing adequate time for anesthesia the procedure was started.  Patient tolerated the procedure well.  See exam note for findings.    LARYNX AND HYPOPHARYNX (mirror or scope)  Voice Quality:  Normal voice quality.  Supra glottis:  Normal, No edema or erythema.  Epiglottis:  Normal, No epiglottic mass or mucosal lesions.  Pyriform sinuses:  Normal, Pyriform sinuses clear.  Vocal cords appearance:  Normal, Vocal cord motion symmetric.  Vocal cord motion:  Normal, Vocal cord motion symmetric  Endolarynx:  Normal, No Endolaryngeal mass or mucosal lesions.    NECK  Masses/lymph nodes:  Normal, No worrisome neck masses or lymph nodes.  Salivary glands:  Normal, Parotid and submandibular glands.  Trachea and larynx position:  Normal, Trachea and larynx midline.  Thyroid:  Normal, No thyroid abnormality.  Tenderness:  Normal, No cervical tenderness.  Suppleness:  Normal, Neck supple    NEUROLOGICAL  Speech pattern:  Normal, Proasaic    RESPIRATORY  Symmetry and Respiratory effort:  Normal, Symmetric chest movement and expansion with no increased intercostal retractions or use of accessory muscles.     IMPRESSION  Left throat pain. Exam negative. No evidence of pathology on endoscopy.  Hearing loss    RECOMMENDATION  CT soft tissue neck  Hearing test.    Froy Mcginnis MD

## 2021-06-21 NOTE — PROGRESS NOTES
PROGRESS NOTE   10/30/2018    SUBJECTIVE:  Bonny Brooks is a 53 y.o. female  who presents for   Chief Complaint   Patient presents with     Follow-up     continues to have very sore thorat sore, BMs have gotten firmer, cough & wheezing is almost gone     Patient comes in today for follow-up after having walking pneumonia.  She was seen in walk-in care on 10/6/2018 and was diagnosed with a viral process.  At that time she was having symptoms which were suggestive of a upper respiratory infection.  She then had worsening of her symptoms was seen on 10/9/2018 in walk-in again and was diagnosed with a pneumonia.  She was given Zithromax as well as Ceftin.  She saw me on 10/12/2018 for follow-up and seem like she was getting gradually a little better.  She finished the Zithromax and the Ceftin prescriptions.  She notes that her cough has gotten a lot better and is pretty much gone.  When I saw her on 10/12/2018 she had no voice because she had lost her voice a couple of days prior to that.  She notes that her voice has come back but is not yet 100%.  She also is continuing to have a significantly sore throat.  She notes that when she was on the antibiotics and immediately after them her bowel movements became very soft.  She sent me an email on 10/22/2018 regarding the soft bowel movements.  She has been monitoring since then and her bowel movements seem to be getting more solid.  Since she has been off the antibiotics every day seems to be a little bit better and so she is we will continue to monitor those for now.  The sore throat that she is having is primarily on the left side of her throat.  It feels like it is incredibly raw.  She on retrospect now realizes that she chews gum all the time and has been unable to chew gum because of this illness.  She is now concerned that may be there is something more serious wrong with her throat that she covered up with gum chewing all the time and now that she has not been  able to chew her gum this is becoming more of an issue for her.  She notes that currently her voice is back but she does not want to talk because her throat feels irritated.  She is not having severe pain in her throat but definitely her throat feels irritated.    Patient Active Problem List   Diagnosis     Hypothyroidism, unspecified type       Current Outpatient Prescriptions   Medication Sig Dispense Refill     levothyroxine (SYNTHROID, LEVOTHROID) 75 MCG tablet Take 1 tablet (75 mcg total) by mouth Daily at 6:00 am. 90 tablet 1     phenylephrine/DM/acetaminop/GG (TYLENOL COLD HEAD CONGESTION ORAL) Take by mouth.       venlafaxine (EFFEXOR-XR) 150 MG 24 hr capsule Take 1 capsule (150 mg total) by mouth daily. 90 capsule 1     ibuprofen (ADVIL,MOTRIN) 200 MG tablet Take 200 mg by mouth every 6 (six) hours as needed for pain.       L.ACID/L.CASEI/B.BIF/B.MARKIE/FOS (PROBIOTIC BLEND ORAL) Take 1 tablet by mouth daily.       No current facility-administered medications for this visit.        Allergies   Allergen Reactions     Penicillins Hives       Past Medical History:   Diagnosis Date     Alcohol abuse     sober now     Gallstone pancreatitis 2017    had gallbladder removed     Genital herpes      Major depressive disorder, recurrent episode, unspecified      Normal delivery     x2     Unspecified hypothyroidism      Unspecified spontaneous  without mention of complication     x2       Past Surgical History:   Procedure Laterality Date     LAPAROSCOPIC CHOLECYSTECTOMY N/A 2017    Procedure: CHOLECYSTECTOMY, LAPAROSCOPIC;  Surgeon: Sandeep Ariza MD;  Location: [facility];  Service:      REDUCTION MAMMAPLASTY         History   Smoking Status     Never Smoker   Smokeless Tobacco     Never Used       OBJECTIVE:     /78  Pulse 67  Temp 97.9  F (36.6  C) (Oral)   Resp 20  Wt 212 lb (96.2 kg)  SpO2 98%  BMI 32.23 kg/m2    Physical Exam:  GENERAL APPEARANCE: A&A, NAD, well hydrated,  well nourished  SKIN:  Normal skin turgor, no lesions/rashes   HEENT: moist mucous membranes, no rhinorrhea, PERRLA, TM's with small amount of fluid present bilaterally but no sign of infection, Throat without significant erythema or exudate.  NECK: Supple, full ROM, no significant thyromegaly,prominent lymph node enlargement noted bilaterally in the submandibular area  CV: RRR, no M/G/R   LUNGS: CTAB, no crackles or wheezes appreciated.    EXTREMITY: no swelling noted.  Full range of motion of all 4 extremities.   NEURO: no gross deficits   PSYCHIATRIC;  Mood appropriate, memory intact      ASSESSMENT/PLAN:     Sore throat [J02.9]      1. Sore throat  - Ambulatory referral to ENT    2. Pneumonia    Patient overall seems to be doing okay.  Her cough is almost completely resolved and her stools are seeming to return to normal after having come off of the antibiotics.  I suspect that the pneumonia has completely resolved because she is not having any symptoms that would be suggestive of pneumonia.  Her cough has almost completely gone away and is no wheezes or crackles noted on exam today.  Patient was quite reassured by that.  She continues to have a significantly sore throat.  She is now concerned because she is wondering if she is has a chronic problem with her throat that she is covered up with chewing gum for all this time.  She does definitely have enlargement of her lymph nodes bilaterally in the submandibular area and neck on the anterior side.  I think she would benefit from seeing ear nose and throat doctor for further evaluation of the sore throat since is persisting and not getting any improvement.  I did place referral to ENT and someone from their office should contact her regarding getting an appointment.  In the meantime she will chew gum and see if that is helpful because it has been helpful in the past.  Patient was reassured that I think overall her illness seems to be getting better.  Certainly the  irritation in her throat could be secondary to the prolonged illness that she had and she is aware of that.  All of her questions were answered today.  If she has additional questions or concerns should certainly let me know. Total time spent with patient was at least 25 minutes,  of which greater than fifty percent was spent in counselling and coordination of care of the above medical problems.  Beronica Serrato MD

## 2021-06-23 NOTE — PROGRESS NOTES
PROGRESS NOTE   1/14/2019    SUBJECTIVE:  Bonny Brooks is a 53 y.o. female  who presents for   Chief Complaint   Patient presents with     Med check     Med check     Patient comes in today for recheck of her hypothyroidism as well as her depression.  She overall is doing well.  She continues on thyroid replacement medication is not have any symptoms that be suggestive of her thyroid being off.  She continues to lose a lot of hair but that has been the case for quite some time.  She has had normal thyroid level since she began losing this amount of hair.  She otherwise does not complain of any other issues that be suggestive of her thyroid levels being off.  She is been on thyroid medication for many years.  We have not had to change her thyroid dose in quite some time.  She also has a history of depression.  She continues on Effexor for that.  She denies that she is suicidal or homicidal.  Please see PHQ 9 and MIKO which are both completed in their entirety today.  She seems to be tolerating the medication without problems.  She seems like she has a little bit more anxiety lately but overall feels like things are doing okay.  She does not feel like she needs any additional assistance with her depression or anxiety.  She does note that when she takes her thyroid pill in the morning sometimes will get stuck in the throat and feels like it is there all day.  She is wondering if there is anything that she can do to help with that.  She also is overdue for a Tdap vaccination and will receive that today.    Patient Active Problem List   Diagnosis     Hypothyroidism, unspecified type       Current Outpatient Medications   Medication Sig Dispense Refill     ibuprofen (ADVIL,MOTRIN) 200 MG tablet Take 200 mg by mouth every 6 (six) hours as needed for pain.       L.ACID/L.CASEI/B.BIF/B.MARKIE/FOS (PROBIOTIC BLEND ORAL) Take 1 tablet by mouth daily.       levothyroxine (SYNTHROID, LEVOTHROID) 75 MCG tablet TAKE ONE TABLET BY  MOUTH EVERY DAY AT 6 AM 90 tablet 0     phenylephrine/DM/acetaminop/GG (TYLENOL COLD HEAD CONGESTION ORAL) Take by mouth.       venlafaxine (EFFEXOR-XR) 150 MG 24 hr capsule Take 1 capsule (150 mg total) by mouth daily. 90 capsule 1     No current facility-administered medications for this visit.        Allergies   Allergen Reactions     Penicillins Hives       Past Medical History:   Diagnosis Date     Alcohol abuse     sober now     Gallstone pancreatitis 2017    had gallbladder removed     Genital herpes      Major depressive disorder, recurrent episode, unspecified      Normal delivery     x2     Unspecified hypothyroidism      Unspecified spontaneous  without mention of complication     x2       Past Surgical History:   Procedure Laterality Date     LAPAROSCOPIC CHOLECYSTECTOMY N/A 2017    Procedure: CHOLECYSTECTOMY, LAPAROSCOPIC;  Surgeon: Sandeep Ariza MD;  Location: [facility] [place];  Service:      REDUCTION MAMMAPLASTY         Social History     Tobacco Use   Smoking Status Never Smoker   Smokeless Tobacco Never Used       OBJECTIVE:     BP 99/64   Pulse 76   Wt 216 lb (98 kg)   LMP 2018 (Approximate)   SpO2 97%   Breastfeeding? No   BMI 32.84 kg/m      Physical Exam:  GENERAL APPEARANCE: A&A, NAD, well hydrated, well nourished  SKIN:  Normal skin turgor, no lesions/rashes   NECK: Supple, full ROM, no significant lymphadenopathy or thyromegaly  CV: RRR, no M/G/R   LUNGS: CTAB  EXTREMITY: no swelling noted.  Full range of motion of all 4 extremities.   NEURO: no gross deficits   PSYCHIATRIC;  Mood appropriate, memory intact  A&O x3, thought processes congruent, non-tangential. No hallucinations/delusions. Insight/judgment: intact. Denies suicidal/homicidal ideations.    LABS:     Recent Results (from the past 240 hour(s))   Thyroid Stimulating Hormone (TSH)   Result Value Ref Range    TSH 1.49 0.30 - 5.00 uIU/mL   T4, Free   Result Value Ref Range    Free T4 1.0 0.7 - 1.8  ng/dL       ASSESSMENT/PLAN:     Hypothyroidism, unspecified type [E03.9]      1. Hypothyroidism, unspecified type  - Thyroid Stimulating Hormone (TSH)  - T4, Free    2. Major depressive disorder with single episode, remission status unspecified  - venlafaxine (EFFEXOR-XR) 150 MG 24 hr capsule; Take 1 capsule (150 mg total) by mouth daily.  Dispense: 90 capsule; Refill: 1    3. Need for Tdap vaccination  - Tdap vaccine,  6yo or older,  IM    Patient overall seems to be doing well.  She continues on her thyroid dose that she has been on for quite some time.  We will recheck thyroid levels today and will contact her with results of those when they return.  She does not currently need a refill of her thyroid medication but when she does she certainly will let me know.  She does note this often when she takes her thyroid medication on an empty stomach and with just a tiny bit of water the throat pills sometimes will get stuck in her throat.  It feels like it gets stuck there all day.  We talked about the fact that we tell people to take it on an empty stomach so that the absorption is better however if she cannot get the pill down without something to eat then she should go ahead and try to eat with it and then we will adjust her thyroid dosage accordingly.  If she takes it with food is going to affect how she absorbs the medication but we can certainly correct that by increasing or decreasing the medication dose that she is on.  She is going to continue to monitor this but probably will start taking it with food and therefore will need recheck of her thyroid level in about 6 months when she returns for follow-up of her depression.  At this point she like to continue to monitor this and see if it continues to get stuck in her throat or if there is something else that is going on and this will resolve.  We will plan to recheck her thyroid level in about 6 months again.  In terms of her depression she seems to be doing  well on Effexor.  Refill of that medication was given today.  3-month supply was given with 1 refill which should take her through the next 6 months.  She has additional questions or concerns or any changes in her symptoms she certainly should let me know.  She has noticed that she has more anxiety recently but she is hoping that that will resolve with a little more time.  She is not suicidal or homicidal.  Please see PHQ 9 and MIKO which were completed today.  Tdap vaccination was given today as well.  We will see her back in 6 months for follow-up.  Beronica Serrato MD

## 2021-09-09 DIAGNOSIS — F33.41 RECURRENT MAJOR DEPRESSIVE DISORDER, IN PARTIAL REMISSION (H): ICD-10-CM

## 2021-09-09 DIAGNOSIS — E03.9 HYPOTHYROIDISM, UNSPECIFIED TYPE: Primary | ICD-10-CM

## 2021-09-09 RX ORDER — LEVOTHYROXINE SODIUM 75 UG/1
75 TABLET ORAL DAILY
Qty: 90 TABLET | Refills: 0 | Status: SHIPPED | OUTPATIENT
Start: 2021-09-09 | End: 2022-01-03

## 2021-09-09 RX ORDER — VENLAFAXINE HYDROCHLORIDE 150 MG/1
CAPSULE, EXTENDED RELEASE ORAL
Qty: 90 CAPSULE | Refills: 0 | Status: SHIPPED | OUTPATIENT
Start: 2021-09-09 | End: 2022-01-03

## 2021-09-09 NOTE — TELEPHONE ENCOUNTER
Pt calling to check status.    Need this refill of Venlafaxine and also Levothyroxine    Pt is out of the Venlalfaxine.

## 2021-09-09 NOTE — TELEPHONE ENCOUNTER
Medication: Levothyroxine 75 MCG tablet  Venlafaxine 150 MG 24 hr capsule  Last Date Filled: Unknown  Last appointment addressing medication: 2/2/21  Last B/P:  BP Readings from Last 3 Encounters:   01/17/20 102/72   05/11/14 112/60     Last labs pertaining to refill: 2/4/21      Pend medication and associate diagnosis before routing to Provider for review.       If patient has not been seen in over 1 year, pend 30 day supply and notify patient they are due for an appointment before any further refills.

## 2021-09-16 NOTE — TELEPHONE ENCOUNTER
Left message to call back for: Patient  Information to relay to patient: Needs to schedule med check/ annual with Dr. Serrato with labs.

## 2021-09-19 ENCOUNTER — HEALTH MAINTENANCE LETTER (OUTPATIENT)
Age: 56
End: 2021-09-19

## 2021-11-14 ENCOUNTER — HEALTH MAINTENANCE LETTER (OUTPATIENT)
Age: 56
End: 2021-11-14

## 2022-01-02 DIAGNOSIS — F33.41 RECURRENT MAJOR DEPRESSIVE DISORDER, IN PARTIAL REMISSION (H): ICD-10-CM

## 2022-01-02 DIAGNOSIS — E03.9 HYPOTHYROIDISM, UNSPECIFIED TYPE: ICD-10-CM

## 2022-01-03 RX ORDER — VENLAFAXINE HYDROCHLORIDE 150 MG/1
CAPSULE, EXTENDED RELEASE ORAL
Qty: 60 CAPSULE | Refills: 0 | Status: SHIPPED | OUTPATIENT
Start: 2022-01-03 | End: 2022-01-12

## 2022-01-03 RX ORDER — LEVOTHYROXINE SODIUM 75 UG/1
TABLET ORAL
Qty: 90 TABLET | Refills: 0 | Status: SHIPPED | OUTPATIENT
Start: 2022-01-03 | End: 2022-01-12

## 2022-01-03 NOTE — TELEPHONE ENCOUNTER
Patient is down to 1 pill left.  Virtual Med Check scheduled for 1/12.  Please call her and let her know that she can pick them up tomorrow

## 2022-01-03 NOTE — TELEPHONE ENCOUNTER
Please call her and let her know that we did refill her medication however she needs to be seen in follow up prior to any further refills.

## 2022-01-12 ENCOUNTER — VIRTUAL VISIT (OUTPATIENT)
Dept: FAMILY MEDICINE | Facility: CLINIC | Age: 57
End: 2022-01-12
Payer: COMMERCIAL

## 2022-01-12 DIAGNOSIS — E03.9 HYPOTHYROIDISM, UNSPECIFIED TYPE: ICD-10-CM

## 2022-01-12 DIAGNOSIS — F33.41 RECURRENT MAJOR DEPRESSIVE DISORDER, IN PARTIAL REMISSION (H): Primary | ICD-10-CM

## 2022-01-12 DIAGNOSIS — Z12.31 ENCOUNTER FOR SCREENING MAMMOGRAM FOR BREAST CANCER: ICD-10-CM

## 2022-01-12 PROCEDURE — 99213 OFFICE O/P EST LOW 20 MIN: CPT | Mod: 95 | Performed by: FAMILY MEDICINE

## 2022-01-12 RX ORDER — VENLAFAXINE HYDROCHLORIDE 150 MG/1
CAPSULE, EXTENDED RELEASE ORAL
Qty: 90 CAPSULE | Refills: 0 | Status: SHIPPED | OUTPATIENT
Start: 2022-01-12

## 2022-01-12 RX ORDER — LEVOTHYROXINE SODIUM 75 UG/1
TABLET ORAL
Qty: 90 TABLET | Refills: 0 | Status: SHIPPED | OUTPATIENT
Start: 2022-01-12 | End: 2022-07-04

## 2022-01-12 ASSESSMENT — ANXIETY QUESTIONNAIRES
3. WORRYING TOO MUCH ABOUT DIFFERENT THINGS: SEVERAL DAYS
5. BEING SO RESTLESS THAT IT IS HARD TO SIT STILL: NOT AT ALL
2. NOT BEING ABLE TO STOP OR CONTROL WORRYING: NOT AT ALL
GAD7 TOTAL SCORE: 4
1. FEELING NERVOUS, ANXIOUS, OR ON EDGE: SEVERAL DAYS
7. FEELING AFRAID AS IF SOMETHING AWFUL MIGHT HAPPEN: SEVERAL DAYS
6. BECOMING EASILY ANNOYED OR IRRITABLE: SEVERAL DAYS
IF YOU CHECKED OFF ANY PROBLEMS ON THIS QUESTIONNAIRE, HOW DIFFICULT HAVE THESE PROBLEMS MADE IT FOR YOU TO DO YOUR WORK, TAKE CARE OF THINGS AT HOME, OR GET ALONG WITH OTHER PEOPLE: SOMEWHAT DIFFICULT

## 2022-01-12 ASSESSMENT — PATIENT HEALTH QUESTIONNAIRE - PHQ9
5. POOR APPETITE OR OVEREATING: NOT AT ALL
SUM OF ALL RESPONSES TO PHQ QUESTIONS 1-9: 2

## 2022-01-12 NOTE — PROGRESS NOTES
Stella is a 56 year old who is being evaluated via a billable video visit.      How would you like to obtain your AVS? MyChart  If the video visit is dropped, the invitation should be resent by: Text to cell phone: [phone]  Will anyone else be joining your video visit? No-  Logan, Myke- Frantz      Video Start Time: 244 pm    Assessment & Plan     Recurrent major depressive disorder, in partial remission (H)  - venlafaxine (EFFEXOR-XR) 150 MG 24 hr capsule  Dispense: 90 capsule; Refill: 0    Hypothyroidism, unspecified type  - T4 free  - TSH  - levothyroxine (SYNTHROID/LEVOTHROID) 75 MCG tablet  Dispense: 90 tablet; Refill: 0    Encounter for screening mammogram for breast cancer  - *MA Screening Digital Bilateral    Patient has been evaluated today for follow-up of her depression and hypothyroidism.  She overall feels like things are doing well.  We will put an order for thyroid levels including TSH and free T4.  Patient will come in next week for a lab only visit to have these labs drawn.  We will go ahead and refill her thyroid medication today.  She is having no symptoms that her thyroid level seems to be off.  She has been on the same dose of thyroid medication for many many years.  In terms of her depression she continues to do well on Effexor 150 mg a day.  Please see PHQ 9 and MIKO which are both completed in their entirety today.  She is not suicidal or homicidal.  She does need a refill of the Effexor which was given today as well.  Patient is overdue for mammogram and that was ordered today as well.  She and I discussed with her that she is overdue for a physical exam.  She will schedule her follow-up for her medications in 6 months and will schedule a physical at that time as well she declined HIV and hepatitis C testing.  She declined shingles vaccination.  We have discussed it in the past and she again declines it.  Colonoscopy is overdue and we discussed that as well.  She would like to revisit  that when she is here for physical exam in 6 months.  She declined flu vaccination today as well she declines COVID vaccination as well.   [id number]}    Return in about 6 months (around 7/12/2022) for Routine preventive, next scheduled follow up.    Beronica Serrato MD  [facility] KIET Douglas is a 56 year old who presents for the following health issues   Patient is evaluated today via video visit rather than office visit due to the COVID 19 outbreak pandemic.  This is done for the protection of patient from potential exposure to this virus by coming to the clinic.    Chief Complaint   Patient presents with     Recheck Medication     Pt notes she is doing well on current regime     Patient is being evaluated today for follow-up of her chronic medical problems.  She has a history of depression as well as hypothyroidism.  She feels like overall things are doing well.  She continues on Effexor 150 mg a day.  This seems to be doing fine and she denies that she is having any difficulties or concerns.  Please see PHQ 9 and MIKO which are both completed in their entirety today.  She is not suicidal or homicidal she would very much like to continue on this medication and does need a refill of that.  She also has a history of hypothyroidism and is currently on Synthroid.  Again that seems to be working well.  She is having no symptoms that would be suggestive of her thyroid being off.  She denies of having any constipation or diarrhea hot or cold intolerance abdominal pain or other issues.  She does need a refill of her thyroid medication today.  She is also due for thyroid levels to be drawn.  We did also discussed with her that she is overdue for mammogram.  I will place an order for that today as well.  She declines HIV and hepatitis C testing.  She declines shingles vaccination.  She and I discussed the fact that she is overdue for colonoscopy and she would like to revisit that when she  is here for physical exam in the near future.  She declines a flu vaccination.     Allergies   Allergen Reactions     Penicillins Rash       Past Medical History:   Diagnosis Date     Alcohol abuse     sober now     Gallstone pancreatitis 2017    had gallbladder removed     Genital herpes      Major depressive disorder, recurrent episode, unspecified      Normal delivery     x2     Unspecified hypothyroidism      Unspecified spontaneous  without mention of complication     x2       Past Surgical History:   Procedure Laterality Date     LAPAROSCOPIC CHOLECYSTECTOMY N/A 2017    Procedure: CHOLECYSTECTOMY, LAPAROSCOPIC;  Surgeon: Sandeep Ariza MD;  Location: [facility] OR;  Service:      MAMMOPLASTY REDUCTION         Family History   Problem Relation Age of Onset     Alcoholism Father      Osteoarthritis Father      Anemia Maternal Grandfather      Thyroid Disease Maternal Grandfather      Anemia Maternal Aunt      Macular Degeneration Paternal Grandmother      Hypertension Paternal Grandmother      Pancreatic Cancer Paternal Uncle      Osteoarthritis Mother      Thyroid Disease Maternal Grandmother          Current Outpatient Medications:      levothyroxine (SYNTHROID/LEVOTHROID) 75 MCG tablet, TAKE 1 TABLET BY MOUTH ONCE DAILY OVERDUE  FOR  MED  CHECK/ANNUAL  WITH  DR LEON  AND  LABS, Disp: 90 tablet, Rfl: 0     venlafaxine (EFFEXOR-XR) 150 MG 24 hr capsule, TAKE 1 CAPSULE BY MOUTH ONCE DAILY . APPOINTMENT REQUIRED FOR FUTURE REFILLS, Disp: 90 capsule, Rfl: 0           Objective           Vitals:  No vitals were obtained today due to virtual visit.    Physical Exam   GENERAL: Healthy, alert and no distress  EYES: Eyes grossly normal to inspection.  No discharge or erythema, or obvious scleral/conjunctival abnormalities.  RESP: No audible wheeze, cough, or visible cyanosis.  No visible retractions or increased work of breathing.    SKIN: Visible skin clear. No significant rash, abnormal  pigmentation or lesions.  NEURO: Cranial nerves grossly intact.  Mentation and speech appropriate for age.  PSYCH: Mentation appears normal, affect normal/bright, judgement and insight intact, normal speech and appearance well-groomed.  A&O x3, thought processes congruent, non-tangential. No hallucinations/delusions. Insight/judgment: intact. Denies suicidal/homicidal ideations.    PHQ-9:  Last PHQ-9 1/12/2022   1.  Little interest or pleasure in doing things 0   2.  Feeling down, depressed, or hopeless 0   3.  Trouble falling or staying asleep, or sleeping too much 0   4.  Feeling tired or having little energy 1   5.  Poor appetite or overeating 0   6.  Feeling bad about yourself 1   7.  Trouble concentrating 0   8.  Moving slowly or restless 0   Q9: Thoughts of better off dead/self-harm past 2 weeks 0   PHQ-9 Total Score 2   Difficulty at work, home, or with people Somewhat difficult       GAD7:  MIKO-7  1/12/2022   1. Feeling nervous, anxious, or on edge 1   2. Not being able to stop or control worrying 0   3. Worrying too much about different things 1   4. Trouble relaxing 0   5. Being so restless that it is hard to sit still 0   6. Becoming easily annoyed or irritable 1   7. Feeling afraid, as if something awful might happen 1   MIKO-7 Total Score 4   If you checked any problems, how difficult have they made it for you to do your work, take care of things at home, or get along with other people? Somewhat difficult             Video-Visit Details    Type of service:  Video Visit    Video End Time: 258 pm    Originating Location (pt. Location): Other Patient was in her car for video visit, patient states car was located in [place]    Distant Location (provider location):  [facility]     Platform used for Video Visit: Felisa

## 2022-01-13 ASSESSMENT — ANXIETY QUESTIONNAIRES: GAD7 TOTAL SCORE: 4

## 2022-06-25 ENCOUNTER — HEALTH MAINTENANCE LETTER (OUTPATIENT)
Age: 57
End: 2022-06-25

## 2022-11-20 ENCOUNTER — HEALTH MAINTENANCE LETTER (OUTPATIENT)
Age: 57
End: 2022-11-20

## 2023-05-16 NOTE — TELEPHONE ENCOUNTER
"Routing refill request to provider for review/approval because:  Labs not current:  No serum creatinine on file in the last year.  Patient needs to be seen because:  It has been more than 6 months since last office visit.  Pt does have virtual visit scheduled for 01/12/2022.  Pt is also due for yearly physical and labs.  No blood pressure on file in the last year. No PHQ-9 score on file in the last year.    Last Written Prescription Date:  09/09/2021-Effexor  Last Fill Quantity: 90,  # refills: 0   Last office visit provider:  02/02/2021 with Dr Serrato.    Requested Prescriptions   Pending Prescriptions Disp Refills     levothyroxine (SYNTHROID/LEVOTHROID) 75 MCG tablet [Pharmacy Med Name: Levothyroxine Sodium 75 MCG Oral Tablet] 90 tablet 0     Sig: TAKE 1 TABLET BY MOUTH ONCE DAILY OVERDUE  FOR  MED  CHECK/ANNUAL  WITH  DR SERRATO  AND  LABS       Thyroid Protocol Passed - 1/3/2022  4:53 PM        Passed - Patient is 12 years or older        Passed - Recent (12 mo) or future (30 days) visit within the authorizing provider's specialty     Patient has had an office visit with the authorizing provider or a provider within the authorizing providers department within the previous 12 mos or has a future within next 30 days. See \"Patient Info\" tab in inbasket, or \"Choose Columns\" in Meds & Orders section of the refill encounter.              Passed - Medication is active on med list        Passed - Normal TSH on file in past 12 months     Recent Labs   Lab Test 02/04/21  1604   TSH 2.59              Passed - No active pregnancy on record     If patient is pregnant or has had a positive pregnancy test, please check TSH.          Passed - No positive pregnancy test in past 12 months     If patient is pregnant or has had a positive pregnancy test, please check TSH.             venlafaxine (EFFEXOR-XR) 150 MG 24 hr capsule [Pharmacy Med Name: Venlafaxine HCl  MG Oral Capsule Extended Release 24 Hour] 90 capsule 0 " "    Sig: TAKE 1 CAPSULE BY MOUTH ONCE DAILY . APPOINTMENT REQUIRED FOR FUTURE REFILLS       Serotonin-Norepinephrine Reuptake Inhibitors  Failed - 1/3/2022  4:53 PM        Failed - Blood pressure under 140/90 in past 12 months     BP Readings from Last 3 Encounters:   01/17/20 102/72   05/11/14 112/60                 Failed - PHQ-9 score of less than 5 in past 6 months     Please review last PHQ-9 score.           Failed - Normal serum creatinine on file in past 12 months     No lab results found.    Ok to refill medication if creatinine is low          Passed - Medication is active on med list        Passed - Patient is age 18 or older        Passed - No active pregnancy on record        Passed - No positive pregnancy test in past 12 months        Passed - Recent (6 mo) or future (30 days) visit within the authorizing provider's specialty     Patient had office visit in the last 6 months or has a visit in the next 30 days with authorizing provider or within the authorizing provider's specialty.  See \"Patient Info\" tab in inbasket, or \"Choose Columns\" in Meds & Orders section of the refill encounter.               Last Written Prescription Date:  09/09/2021  Last Fill Quantity: 90,  # refills: 0   Last office visit provider:  02/02/2021 with Dr Serrato.     Requested Prescriptions   Pending Prescriptions Disp Refills     levothyroxine (SYNTHROID/LEVOTHROID) 75 MCG tablet [Pharmacy Med Name: Levothyroxine Sodium 75 MCG Oral Tablet] 90 tablet 0     Sig: TAKE 1 TABLET BY MOUTH ONCE DAILY OVERDUE  FOR  MED  CHECK/ANNUAL  WITH  DR SERRATO  AND  LABS       Thyroid Protocol Passed - 1/3/2022  4:53 PM        Passed - Patient is 12 years or older        Passed - Recent (12 mo) or future (30 days) visit within the authorizing provider's specialty     Patient has had an office visit with the authorizing provider or a provider within the authorizing providers department within the previous 12 mos or has a future within next " "30 days. See \"Patient Info\" tab in inbasket, or \"Choose Columns\" in Meds & Orders section of the refill encounter.              Passed - Medication is active on med list        Passed - Normal TSH on file in past 12 months     Recent Labs   Lab Test 02/04/21  1604   TSH 2.59              Passed - No active pregnancy on record     If patient is pregnant or has had a positive pregnancy test, please check TSH.          Passed - No positive pregnancy test in past 12 months     If patient is pregnant or has had a positive pregnancy test, please check TSH.             venlafaxine (EFFEXOR-XR) 150 MG 24 hr capsule [Pharmacy Med Name: Venlafaxine HCl  MG Oral Capsule Extended Release 24 Hour] 90 capsule 0     Sig: TAKE 1 CAPSULE BY MOUTH ONCE DAILY . APPOINTMENT REQUIRED FOR FUTURE REFILLS       Serotonin-Norepinephrine Reuptake Inhibitors  Failed - 1/3/2022  4:53 PM        Failed - Blood pressure under 140/90 in past 12 months     BP Readings from Last 3 Encounters:   01/17/20 102/72   05/11/14 112/60                 Failed - PHQ-9 score of less than 5 in past 6 months     Please review last PHQ-9 score.           Failed - Normal serum creatinine on file in past 12 months     No lab results found.    Ok to refill medication if creatinine is low          Passed - Medication is active on med list        Passed - Patient is age 18 or older        Passed - No active pregnancy on record        Passed - No positive pregnancy test in past 12 months        Passed - Recent (6 mo) or future (30 days) visit within the authorizing provider's specialty     Patient had office visit in the last 6 months or has a visit in the next 30 days with authorizing provider or within the authorizing provider's specialty.  See \"Patient Info\" tab in inbasket, or \"Choose Columns\" in Meds & Orders section of the refill encounter.                 Qian Jensen RN  [facility] Nurse Advisor  1/3/2022 at 5:14 PM      " NEGATIVE

## 2023-07-08 ENCOUNTER — HEALTH MAINTENANCE LETTER (OUTPATIENT)
Age: 58
End: 2023-07-08

## 2023-11-25 ENCOUNTER — HEALTH MAINTENANCE LETTER (OUTPATIENT)
Age: 58
End: 2023-11-25

## 2024-08-31 ENCOUNTER — HEALTH MAINTENANCE LETTER (OUTPATIENT)
Age: 59
End: 2024-08-31